# Patient Record
Sex: MALE | Race: WHITE
[De-identification: names, ages, dates, MRNs, and addresses within clinical notes are randomized per-mention and may not be internally consistent; named-entity substitution may affect disease eponyms.]

---

## 2018-06-19 ENCOUNTER — HOSPITAL ENCOUNTER (OUTPATIENT)
Age: 63
End: 2018-06-19
Payer: MEDICARE

## 2018-06-19 DIAGNOSIS — M79.604: Primary | ICD-10-CM

## 2018-06-19 DIAGNOSIS — M79.89: ICD-10-CM

## 2018-06-19 DIAGNOSIS — M25.561: ICD-10-CM

## 2018-06-19 PROCEDURE — 73562 X-RAY EXAM OF KNEE 3: CPT

## 2018-06-19 PROCEDURE — 93971 EXTREMITY STUDY: CPT

## 2018-12-07 ENCOUNTER — HOSPITAL ENCOUNTER (OUTPATIENT)
Age: 63
End: 2018-12-07
Payer: MEDICARE

## 2018-12-07 DIAGNOSIS — M17.0: ICD-10-CM

## 2018-12-07 DIAGNOSIS — R05: ICD-10-CM

## 2018-12-07 DIAGNOSIS — N62: Primary | ICD-10-CM

## 2018-12-07 PROCEDURE — 76641 ULTRASOUND BREAST COMPLETE: CPT

## 2018-12-07 PROCEDURE — 71046 X-RAY EXAM CHEST 2 VIEWS: CPT

## 2018-12-07 PROCEDURE — 77066 DX MAMMO INCL CAD BI: CPT

## 2018-12-07 PROCEDURE — 73565 X-RAY EXAM OF KNEES: CPT

## 2018-12-27 ENCOUNTER — HOSPITAL ENCOUNTER (OUTPATIENT)
Age: 63
End: 2018-12-27
Payer: MEDICARE

## 2018-12-27 DIAGNOSIS — M25.561: Primary | ICD-10-CM

## 2018-12-27 DIAGNOSIS — M25.562: ICD-10-CM

## 2018-12-27 PROCEDURE — 73564 X-RAY EXAM KNEE 4 OR MORE: CPT

## 2019-03-25 ENCOUNTER — HOSPITAL ENCOUNTER (OUTPATIENT)
Age: 64
End: 2019-03-25
Payer: MEDICARE

## 2019-03-25 DIAGNOSIS — R60.1: Primary | ICD-10-CM

## 2019-03-25 PROCEDURE — 93971 EXTREMITY STUDY: CPT

## 2019-05-15 ENCOUNTER — OFFICE VISIT (OUTPATIENT)
Dept: ORTHOPEDIC SURGERY | Facility: CLINIC | Age: 64
End: 2019-05-15

## 2019-05-15 VITALS — HEART RATE: 92 BPM | BODY MASS INDEX: 34.01 KG/M2 | OXYGEN SATURATION: 99 % | WEIGHT: 242.95 LBS | HEIGHT: 71 IN

## 2019-05-15 DIAGNOSIS — M17.0 PRIMARY OSTEOARTHRITIS OF BOTH KNEES: Primary | ICD-10-CM

## 2019-05-15 PROCEDURE — 99204 OFFICE O/P NEW MOD 45 MIN: CPT | Performed by: PHYSICIAN ASSISTANT

## 2019-05-15 RX ORDER — ATORVASTATIN CALCIUM 80 MG/1
80 TABLET, FILM COATED ORAL NIGHTLY
COMMUNITY
Start: 2019-03-07

## 2019-05-15 RX ORDER — CARVEDILOL 12.5 MG/1
TABLET ORAL
COMMUNITY
Start: 2019-04-22 | End: 2021-02-17

## 2019-05-15 RX ORDER — LEVOTHYROXINE SODIUM 0.07 MG/1
75 TABLET ORAL DAILY
COMMUNITY
Start: 2019-02-28

## 2019-05-15 RX ORDER — HYDROCODONE BITARTRATE AND ACETAMINOPHEN 7.5; 325 MG/1; MG/1
1 TABLET ORAL EVERY 6 HOURS PRN
Status: ON HOLD | COMMUNITY
Start: 2019-04-18 | End: 2021-04-13 | Stop reason: SDUPTHER

## 2019-05-15 RX ORDER — ALLOPURINOL 100 MG/1
100 TABLET ORAL DAILY
COMMUNITY
End: 2021-02-17

## 2019-05-15 RX ORDER — LOSARTAN POTASSIUM 100 MG/1
100 TABLET ORAL DAILY
COMMUNITY

## 2019-05-15 RX ORDER — MELOXICAM 15 MG/1
TABLET ORAL
COMMUNITY
Start: 2019-04-20 | End: 2019-05-17

## 2019-05-15 RX ORDER — HYDROCHLOROTHIAZIDE 25 MG/1
TABLET ORAL
COMMUNITY
Start: 2019-02-05 | End: 2021-02-17

## 2019-05-15 NOTE — PROGRESS NOTES
[facility] [place] [facility] Note    Subjective     Patient: Artur Alicia  : 1955    Primary Care Provider: Derrick Acosta MD    Requesting Provider: As above    Pain of the Left Knee and Pain of the Right Knee      History    Chief Complaint: Bilateral knee pain    History of Present Illness: This is a very pleasant 64-year-old male presenting today to discuss his bilateral knee pain.  He reports one is not more painful than the other.  He has had pain for 1 or more years.  He is status post right knee surgery in .  He reports he was in a long-leg cast for 6 months following surgery on a ligament.  He complains of pain to be 5/10 with both functional and night pain.  He complains of swelling pain and stiffness.  He is treated with anti-inflammatories and can use weight loss as well as steroid injection with with persisting day and night pain.  He has been treated by orthopedist at [facility] with steroid injection in the right knee on 2019 and on the left in May 2019.  He is not getting any relief from the steroids any longer.  He is used both topical and oral anti-inflammatories.  He is ready to discuss knee replacement.    Current Outpatient Medications on File Prior to Visit   Medication Sig Dispense Refill   • allopurinol (ZYLOPRIM) 100 MG tablet Take 100 mg by mouth Daily.     • losartan (COZAAR) 100 MG tablet Take 100 mg by mouth Daily.     • atorvastatin (LIPITOR) 80 MG tablet      • carvedilol (COREG) 12.5 MG tablet      • hydrochlorothiazide (HYDRODIURIL) 25 MG tablet      • HYDROcodone-acetaminophen (NORCO) 7.5-325 MG per tablet      • levothyroxine (SYNTHROID, LEVOTHROID) 75 MCG tablet      • meloxicam (MOBIC) 15 MG tablet        No current facility-administered medications on file prior to visit.       Allergies   Allergen Reactions   • Keflex [Cephalexin] Hives and Rash      Past Medical History:   Diagnosis Date   • Hypertension      Past Surgical History:  "  Procedure Laterality Date   • KNEE SURGERY Right      Family History   Problem Relation Age of Onset   • No Known Problems Mother    • Heart attack Father       Social History     Socioeconomic History   • Marital status:      Spouse name: Not on file   • Number of children: Not on file   • Years of education: Not on file   • Highest education level: Not on file   Tobacco Use   • Smoking status: Former Smoker     Packs/day: 2.00     Years: 30.00     Pack years: 60.00     Types: Cigarettes     Last attempt to quit: 2010     Years since quittin.3   • Smokeless tobacco: Never Used   Substance and Sexual Activity   • Alcohol use: No     Frequency: Never   • Drug use: No   • Sexual activity: Defer        Review of Systems   Constitutional: Negative.    HENT: Negative.    Eyes: Negative.    Respiratory: Negative.    Cardiovascular: Negative.    Gastrointestinal: Negative.    Endocrine: Negative.    Genitourinary: Negative.    Musculoskeletal: Positive for arthralgias.   Skin: Negative.    Allergic/Immunologic: Negative.    Neurological: Negative.    Hematological: Negative.    Psychiatric/Behavioral: Negative.        The following portions of the patient's history were reviewed and updated as appropriate: allergies, current medications, past family history, past medical history, past social history, past surgical history and problem list.      Objective      Physical Exam  Pulse 92   Ht 180.3 cm (71\")   Wt 110 kg (242 lb 15.2 oz)   SpO2 99%   BMI 33.88 kg/m²     Body mass index is 33.88 kg/m².    GENERAL: Body habitus: obese    Lower extremity edema: Left: trace; Right: trace    Varicose veins:  Left: mild; Right: mild    Gait: antalgic     Mental Status:  awake and alert; oriented to person, place, and time    Voice:  clear  SKIN:  Normal    Hair Growth:  Right:normal; Left:  normal  HEENT: Head: Normocephalic, atraumatic,  without obvious abnormality.  eye: normal external eye, no " icterus   PULM:  Repiratory effort normal    Ortho Exam  Right Hip Exam  ----------  FLEXION CONTRACTURE: None  FLEXION: 110 degrees  INTERNAL ROTATION: 20 degrees at 90 degrees of flexion   EXTERNAL ROTATION: 40 degrees at 90 degrees of flexion    PAIN WITH HIP MOTION: no      Right Knee Exam  ----------  ALIGNMENT: Right: neutral----------  RANGE OF MOTION:  Right: Normal (0-120 degrees) with no extensor lag or flexion contracture  LIGAMENTOUS STABILITY:   Right:stable to varus and valgus stress at terminal extension and 30 degrees without any evidence of laxity----------  STRENGTH:  KNEE FLEXION Right 5/5  KNEE EXTENSION Right 5/5 ----------  PAIN WITH PALPATION: Right medial joint line and lateral joint line  PAIN WITH KNEE ROM: Right no  PATELLAR CREPITUS: Right yes   ----------  SENSATION TO LIGHT TOUCH:  DEEP PERONEAL/SUPERFICIAL PERONEAL/SURAL/SAPHENOUS/TIBIAL:   Right intact----------  EFFUSION  Right:  no;  ERYTHEMA:  Right: no;  WOUNDS/INCISIONS: well healed lateral incision, no overlying skin problems.    Left Hip Exam  ---------  FLEXION CONTRACTURE: None  FLEXION: 110 degrees  INTERNAL ROTATION: 20 degrees at 90 degrees of flexion   EXTERNAL ROTATION: 40 degrees at 90 degrees of flexion    PAIN WITH HIP MOTION: no      Left Knee Exam  ----------  Knee Exam:  ----------  ALIGNMENT:  Left: neutral  ----------  RANGE OF MOTION:  Left: Normal (0-120 degrees) with no extensor lag or flexion contracture  LIGAMENTOUS STABILITY:   Left:stable to varus and valgus stress at terminal extension and 30 degrees without any evidence of laxity   ----------  STRENGTH:  KNEE FLEXION  Left 5/5  KNEE EXTENSION Left 5/5  ----------  PAIN WITH PALPATION: Left medial joint line and lateral joint line  PAIN WITH KNEE ROM:  Left no  PATELLAR CREPITUS:  Left yes  ----------  SENSATION TO LIGHT TOUCH:  DEEP PERONEAL/SUPERFICIAL PERONEAL/SURAL/SAPHENOUS/TIBIAL:   Left intact  ----------  EFFUSION:   Left:  no  ERYTHEMA:  ; Left:   no  WOUNDS/INCISIONS: none, no overlying skin problems.      Medical Decision Making    Data Review:   ordered and reviewed x-rays today    Assessment:  1. Primary osteoarthritis of both knees        Plan:  Bilateral knee arthritis.  I reviewed today's x-rays and clinical findings with the patient.  On exam, he has medial lateral joint line tenderness bilaterally with crepitus no evidence of ligament or meniscal pathology causing his pain.  X-rays today show bone-on-bone contact medial compartment, tricompartmental osteophytes, no acute bony abnormalities, with patellofemoral degeneration bilaterally.  Patient has exhausted conservative treatment with injections anti-inflammatories, weight loss, cane use, activity modification with persisting and worsening day and night pain.  He is ready to consider knee replacement and would like to have both knees done at once.  He did have a steroid injection in the left knee on May 3, therefore, he would not be able to have surgery until early August.  We discussed with him the perioperative and postoperative period of knee replacement.  Emphasized that having bilateral knee replacements essentially doubles the risks involved with surgery.  Patient understands it would like to proceed with bilateral total knee.  He does have a good help at home.  Plan today is to get him scheduled for a bilateral total knee arthroplasty with Dr. Kline at [facility] in August 2019.    Surgical Counseling     I have informed the patient of the diagnosis and the prognosis.  Exhaustive conservative treatment modalities have not resulted in long term pain relief.  The symptoms have progressed to the point of daily pain and inability to perform activities of daily living without significant pain. The patient has reached the point of desiring to proceed with total knee arthroplasty after discussing the risks, benefits and alternatives to the procedure.  The surgical procedure itself was discussed  in detail. Risks of the procedure were discussed, which included but are not limited to, bleeding, infection, damage to blood vessels and nerves, incomplete pain relief, loosening of the prosthesis, deep infection, need for further surgery, loss of limb, deep venous thrombosis, pulmonary embolus, death, heart attack, stroke, kidney failure, liver failure, and anesthetic complications.  In addition, the potential for deep infection developing in the future was discussed, which could require further surgery.  The knee would have to be re-opened, debrided, and potentially remove the prosthesis, which may or may not be replaced in the future.  Also, the possibility for loosening of the prosthesis has been mentioned.  If the prosthesis loosened, a revision arthroplasty could be performed, with results that are not as predictable compared to the original procedure.  The typical rehabilitative course has also been discussed, and full recovery may take up to a year to see the maximum benefit.  The importance of patient cooperation in the rehabilitative efforts has also been discussed.  No guarantees whatsoever were given.  The patient understands the potential risks versus the benefits and desires to proceed with total knee arthroplasty at a mutually convenient time.        Nona Lopes PA-C  05/16/19  4:00 PM

## 2019-05-17 ENCOUNTER — PREP FOR SURGERY (OUTPATIENT)
Dept: OTHER | Facility: HOSPITAL | Age: 64
End: 2019-05-17

## 2019-05-17 DIAGNOSIS — M17.0 PRIMARY OSTEOARTHRITIS OF BOTH KNEES: Primary | ICD-10-CM

## 2019-05-17 RX ORDER — PREGABALIN 150 MG/1
150 CAPSULE ORAL ONCE
Status: CANCELLED | OUTPATIENT
Start: 2019-05-17 | End: 2019-05-17

## 2019-05-17 RX ORDER — ACETAMINOPHEN 500 MG
1000 TABLET ORAL ONCE
Status: CANCELLED | OUTPATIENT
Start: 2019-05-17 | End: 2019-05-17

## 2019-05-17 RX ORDER — CEFAZOLIN SODIUM 2 G/100ML
2 INJECTION, SOLUTION INTRAVENOUS ONCE
Status: CANCELLED | OUTPATIENT
Start: 2019-05-17 | End: 2019-05-17

## 2019-05-17 RX ORDER — MELOXICAM 15 MG/1
15 TABLET ORAL ONCE
Status: CANCELLED | OUTPATIENT
Start: 2019-05-17 | End: 2019-05-17

## 2019-05-17 NOTE — PROGRESS NOTES
I have reviewed the notes, assessments, and/or procedures performed by Nona Lopes PA-C, I concur with her documentation of Artur Alicia.

## 2019-06-03 ENCOUNTER — TELEPHONE (OUTPATIENT)
Dept: ORTHOPEDIC SURGERY | Facility: CLINIC | Age: 64
End: 2019-06-03

## 2019-06-03 NOTE — TELEPHONE ENCOUNTER
I called him back and advised him to elevate his knees higher than his heart, ice them 20 minutes out of the hour and to also use a compression sleeve or ace wrap around the knee as well as to take some Aleve. He understood and will try these things.  Brittaney

## 2019-07-10 ENCOUNTER — HOSPITAL ENCOUNTER (OUTPATIENT)
Age: 64
End: 2019-07-10
Payer: MEDICARE

## 2019-07-10 DIAGNOSIS — M19.90: ICD-10-CM

## 2019-07-10 DIAGNOSIS — M1A.09X0: Primary | ICD-10-CM

## 2019-07-10 LAB
ALBUMIN LEVEL: 3 GM/DL (ref 3.4–5)
ALBUMIN/GLOB SERPL: 0.8 {RATIO} (ref 1.1–1.8)
ALP ISO SERPL-ACNC: 98 U/L (ref 46–116)
ALT SERPLBLD-CCNC: 30 U/L (ref 12–78)
ANION GAP SERPL CALC-SCNC: 14.1 MEQ/L (ref 5–15)
AST SERPL QL: 24 U/L (ref 15–37)
BILIRUBIN,TOTAL: 0.6 MG/DL (ref 0.2–1)
BUN SERPL-MCNC: 18 MG/DL (ref 7–18)
CALCIUM SPEC-MCNC: 9.4 MG/DL (ref 8.5–10.1)
CHLORIDE SPEC-SCNC: 100 MMOL/L (ref 98–107)
CO2 SERPL-SCNC: 26 MMOL/L (ref 21–32)
CREAT BLD-SCNC: 1.1 MG/DL (ref 0.7–1.3)
ESTIMATED GLOMERULAR FILT RATE: 68 ML/MIN (ref 60–?)
GFR (AFRICAN AMERICAN): 82 ML/MIN (ref 60–?)
GLOBULIN SER CALC-MCNC: 3.9 GM/DL (ref 1.3–3.2)
GLUCOSE: 107 MG/DL (ref 74–106)
HCT VFR BLD CALC: 33.1 % (ref 42–52)
HGB BLD-MCNC: 10.4 G/DL (ref 14.1–18)
MCHC RBC-ENTMCNC: 31.3 G/DL (ref 31.8–35.4)
MCV RBC: 91.4 FL (ref 80–94)
MEAN CORPUSCULAR HEMOGLOBIN: 28.6 PG (ref 27–31.2)
PLATELET # BLD: 395 K/MM3 (ref 142–424)
POTASSIUM: 4.1 MMOL/L (ref 3.5–5.1)
PROT SERPL-MCNC: 6.9 GM/DL (ref 6.4–8.2)
RBC # BLD AUTO: 3.62 M/MM3 (ref 4.6–6.2)
SODIUM SPEC-SCNC: 136 MMOL/L (ref 136–145)
URATE SERPL-SCNC: 5.7 MG/DL (ref 2.6–7.2)
WBC # BLD AUTO: 5.5 K/MM3 (ref 4.8–10.8)

## 2019-07-10 PROCEDURE — 80053 COMPREHEN METABOLIC PANEL: CPT

## 2019-07-10 PROCEDURE — 86235 NUCLEAR ANTIGEN ANTIBODY: CPT

## 2019-07-10 PROCEDURE — 86431 RHEUMATOID FACTOR QUANT: CPT

## 2019-07-10 PROCEDURE — 86225 DNA ANTIBODY NATIVE: CPT

## 2019-07-10 PROCEDURE — 85025 COMPLETE CBC W/AUTO DIFF WBC: CPT

## 2019-07-10 PROCEDURE — 36415 COLL VENOUS BLD VENIPUNCTURE: CPT

## 2019-07-10 PROCEDURE — 84550 ASSAY OF BLOOD/URIC ACID: CPT

## 2019-07-10 PROCEDURE — 86200 CCP ANTIBODY: CPT

## 2019-07-10 PROCEDURE — 86038 ANTINUCLEAR ANTIBODIES: CPT

## 2019-07-11 LAB — RA LATEX TURBID.: 219 IU/ML (ref 0–13.9)

## 2019-07-12 LAB — CCP IGA+IGG SERPL IA-ACNC: 232 UNITS (ref 0–19)

## 2019-07-25 ENCOUNTER — APPOINTMENT (OUTPATIENT)
Dept: PREADMISSION TESTING | Facility: HOSPITAL | Age: 64
End: 2019-07-25

## 2019-09-03 ENCOUNTER — TELEPHONE (OUTPATIENT)
Dept: ORTHOPEDIC SURGERY | Facility: CLINIC | Age: 64
End: 2019-09-03

## 2019-09-03 NOTE — TELEPHONE ENCOUNTER
----- Message from Edenilson Kline MD sent at 8/30/2019  2:53 PM EDT -----  Regarding: RE: Surgery  Thanks - he may be back in the future looking at his xrays!    ----- Message -----  From: Geri Martin  Sent: 8/30/2019  12:34 PM  To: Edenilson Kline MD  Subject: RE: Surgery                                      I did call patient, he told me he cancelled at the advice of his PCP Dr Acosta, due to a new onset of rheumatoid arthritis.  There is a note in patient's chart from Dr Acosta.  ----- Message -----  From: Edenilson Kline MD  Sent: 8/30/2019  11:32 AM  To: Geri Martin  Subject: Surgery                                          Remind me - why did he cancel surgery?

## 2021-02-17 ENCOUNTER — OFFICE VISIT (OUTPATIENT)
Dept: ORTHOPEDIC SURGERY | Facility: CLINIC | Age: 66
End: 2021-02-17

## 2021-02-17 VITALS — HEIGHT: 71 IN | HEART RATE: 75 BPM | BODY MASS INDEX: 37.18 KG/M2 | OXYGEN SATURATION: 99 % | WEIGHT: 265.6 LBS

## 2021-02-17 DIAGNOSIS — M17.12 PRIMARY OSTEOARTHRITIS OF LEFT KNEE: Primary | ICD-10-CM

## 2021-02-17 DIAGNOSIS — M17.11 PRIMARY OSTEOARTHRITIS OF RIGHT KNEE: ICD-10-CM

## 2021-02-17 PROCEDURE — 99214 OFFICE O/P EST MOD 30 MIN: CPT | Performed by: ORTHOPAEDIC SURGERY

## 2021-02-17 RX ORDER — ACETAMINOPHEN 325 MG/1
1000 TABLET ORAL ONCE
Status: CANCELLED | OUTPATIENT
Start: 2021-02-17 | End: 2021-02-17

## 2021-02-17 RX ORDER — PREGABALIN 150 MG/1
150 CAPSULE ORAL ONCE
Status: CANCELLED | OUTPATIENT
Start: 2021-02-17 | End: 2021-02-17

## 2021-02-17 RX ORDER — MELOXICAM 7.5 MG/1
15 TABLET ORAL ONCE
Status: CANCELLED | OUTPATIENT
Start: 2021-02-17 | End: 2021-02-17

## 2021-02-17 RX ORDER — BISOPROLOL FUMARATE 10 MG/1
10 TABLET, FILM COATED ORAL DAILY
COMMUNITY
Start: 2020-11-30

## 2021-02-17 RX ORDER — GABAPENTIN 100 MG/1
100 CAPSULE ORAL 3 TIMES DAILY PRN
COMMUNITY
Start: 2021-02-05

## 2021-02-17 NOTE — PROGRESS NOTES
[facility] [place] [facility] [place]    Subjective     Chief Complaint   Patient presents with   • Left Knee - Pain     Left knee pain worse than right   • Right Knee - Pain        HPI    Artur Pearson is a 66 y.o. male who presents with new problem of bilateral knee pain, left worse than right. He has a known history of arthritis in bilateral knees. The patient would like to have surgery performed on his left knee at this time. He reports his pain is an 8 out of 10. His pain worsens when he walks, climbs stairs, and stands. His pain is associated with stiffness and giving away. He has tried 2 previous knee injections. The 1st injection provided him great relief and states that the 2nd injection provided minimal relief due to getting rheumatoid arthritis. His last injection was over 1 year ago.     Artur has no medical history of diabetes. Additionally, he does not smoke. He denies any history of clots or clotting problems. He is not currently taking anticoagulants.     I have reviewed the following portions of the patient's history and agree with: History of Present Illness and Review of Systems    Patient Active Problem List   Diagnosis   • Primary osteoarthritis of both knees     Past Medical History:   Diagnosis Date   • Hypertension    • Rheumatoid arthritis (CMS/HCC)       Past Surgical History:   Procedure Laterality Date   • KNEE SURGERY Right       Family History   Problem Relation Age of Onset   • No Known Problems Mother    • Heart attack Father      Social History     Socioeconomic History   • Marital status:      Spouse name: Not on file   • Number of children: Not on file   • Years of education: Not on file   • Highest education level: Not on file   Tobacco Use   • Smoking status: Former Smoker     Packs/day: 2.00     Years: 30.00     Pack years: 60.00     Types: Cigarettes     Quit date:      Years since quittin.1   • Smokeless tobacco: Never Used   Substance and Sexual  Activity   • Alcohol use: No     Frequency: Never   • Drug use: No   • Sexual activity: Defer      Current Outpatient Medications on File Prior to Visit   Medication Sig Dispense Refill   • atorvastatin (LIPITOR) 80 MG tablet      • bisoprolol (ZEBeta) 10 MG tablet      • Etanercept (ENBREL SC) Inject  under the skin into the appropriate area as directed 1 (One) Time Per Week.     • gabapentin (NEURONTIN) 100 MG capsule      • HYDROcodone-acetaminophen (NORCO) 7.5-325 MG per tablet      • levothyroxine (SYNTHROID, LEVOTHROID) 75 MCG tablet      • losartan (COZAAR) 100 MG tablet Take 100 mg by mouth Daily.       No current facility-administered medications on file prior to visit.       Allergies   Allergen Reactions   • Keflex [Cephalexin] Hives and Rash        Review of Systems   Constitutional: Negative for activity change, appetite change, chills, diaphoresis, fatigue, fever and unexpected weight change.   HENT: Negative for congestion, dental problem, drooling, ear discharge, ear pain, facial swelling, hearing loss, mouth sores, nosebleeds, postnasal drip, rhinorrhea, sinus pressure, sneezing, sore throat, tinnitus, trouble swallowing and voice change.    Eyes: Negative for photophobia, pain, discharge, redness, itching and visual disturbance.   Respiratory: Negative for apnea, cough, choking, chest tightness, shortness of breath, wheezing and stridor.    Cardiovascular: Negative for chest pain, palpitations and leg swelling.   Gastrointestinal: Negative for abdominal distention, abdominal pain, anal bleeding, blood in stool, constipation, diarrhea, nausea, rectal pain and vomiting.   Endocrine: Negative for cold intolerance, heat intolerance, polydipsia, polyphagia and polyuria.   Genitourinary: Negative for decreased urine volume, difficulty urinating, dysuria, enuresis, flank pain, frequency, genital sores, hematuria and urgency.   Musculoskeletal: Positive for arthralgias. Negative for back pain, gait  "problem, joint swelling, myalgias, neck pain and neck stiffness.   Skin: Negative for color change, pallor, rash and wound.   Allergic/Immunologic: Negative for environmental allergies, food allergies and immunocompromised state.   Neurological: Negative for dizziness, tremors, seizures, syncope, facial asymmetry, speech difficulty, weakness, light-headedness, numbness and headaches.   Hematological: Negative for adenopathy. Does not bruise/bleed easily.   Psychiatric/Behavioral: Negative for agitation, behavioral problems, confusion, decreased concentration, dysphoric mood, hallucinations, self-injury, sleep disturbance and suicidal ideas. The patient is not nervous/anxious and is not hyperactive.         Objective      Physical Exam  Pulse 75   Ht 180.3 cm (70.98\")   Wt 120 kg (265 lb 9.6 oz)   SpO2 99%   BMI 37.06 kg/m²     Body mass index is 37.06 kg/m².      General:   • Mental Status:  Alert   • Appearance: Cooperative, in no acute distress   • Build and Nutrition: Obese male.  • Orientation: Alert and oriented to person, place and time   • Posture: Normal   • Gait: Antalgic on both lower extremities.     Integument  • Right knee: No skin lesions, rash, or ecchymosis.  • Left knee: No skin lesions, rash, or ecchymosis.    Lower Extremities  • Right Knee:       • Tenderness: No medial or lateral joint line tenderness.       • Swelling: None       • Effusion: None      • Crepitus: Positive.      • Atrophy: None      • Range of motion:        • Extension: 5°        • Flexion: 120°       • Instability: No varus or valgus laxity. Negative anterior drawer.      • Deformities: Varus alignment.    Lower Extremities  • Left knee:       • Tenderness: Medial joint line tenderness.       • Swelling: None       • Effusion: None      • Crepitus: Positive      • Atrophy: None      • Range of motion:        • Extension: 5°        • Flexion: 120°       • Instability: No varus or valgus laxity. Negative anterior drawer.     "  • Deformities: Varus alignment.    Imaging/Studies      Imaging Results (Last 24 Hours)     Procedure Component Value Units Date/Time    XR Knee 4+ View Bilateral [[id number]] Resulted: 02/17/21 1648     Updated: 02/17/21 1649    Narrative:      Right Knee Radiographs  Indication: right knee pain  Views: Standing AP's and skiers of both knees, with lateral and sunrise   views of the right knee    Comparison: 5/15/2019    Findings:   Bone-on-bone contact both medially and laterally, with tricompartmental   osteophytes, no acute bony abnormalities.  Worsening compared to previous   films.  No unusual bony features.    Left Knee Radiographs  Indication: left knee pain  Views: Standing AP's and skiers of both knees, with lateral and sunrise   views of the left knee    Comparison: 5/15/2019    Findings:   Bone-on-bone contact both medially and laterally, with tricompartmental   osteophytes, no acute bony abnormalities.  Worsening compared to previous   films.  No unusual bony features.          Assessment and Plan     Diagnoses and all orders for this visit:    1. Primary osteoarthritis of left knee (Primary)  -     XR Knee 4+ View Bilateral  -     Case Request; Standing  -     Instructions on coughing, deep breathing, and incentive spirometry.; Future  -     CBC and Differential; Future  -     Basic metabolic panel; Future  -     Protime-INR; Future  -     APTT; Future  -     Hemoglobin A1c; Future  -     Sedimentation rate; Future  -     C-reactive protein; Future  -     Tranexamic Acid 1,000 mg in sodium chloride 0.9 % 100 mL  -     Tranexamic Acid 1,000 mg in sodium chloride 0.9 % 100 mL  -     acetaminophen (TYLENOL) tablet 975 mg  -     meloxicam (MOBIC) tablet 15 mg  -     pregabalin (LYRICA) capsule 150 mg  -     mupirocin (BACTROBAN) 2 % nasal ointment 1 application  -     clindamycin (CLEOCIN) 900 mg in dextrose (D5W) 5 % 100 mL IVPB  -     vancomycin (VANCOCIN) 1,750 mg in sodium chloride 0.9 % 250 mL IVPB  -      Case Request    2. Primary osteoarthritis of right knee  -     XR Knee 4+ View Bilateral    Other orders  -     Outpatient In A Bed; Standing  -     Follow Anesthesia Guidelines / Standing Orders; Future  -     Obtain informed consent  -     Provide instructions to patient regarding NPO status  -     Chlorhexidine Skin Prep - Educate and Review With Patient; Future  -     Provide Patient With ERAS Hydration Instructions  -     Provide Patient With Enhanced Recovery Booklet(s) or Handout  -     Provide Instructions/Handout For Benzoyl Peroxide 5% Wash If Having Shoulder/Arm Surgery (If Prescribed)  -     Provide Instructions/Handout For Bactroban And Chlorhexidine Shower (If Prescribed)  -     Perform A Memory Screening On All Hip/Knee Replacement Patients >Or Equal To 65 Years Or Older  -     Complete A PROMIS And HOOS Or KOOS Survey If Having Hip Or Knee Replacement  -     Follow Anesthesia Guidelines / Standing Orders; Standing  -     Nerve Block; Standing  -     Verify NPO Status; Standing  -     Verify The Time Patient Completed ERAS Hydration Drink; Standing  -     SCD (sequential compression device)- to be placed on patient in Pre-op; Standing  -     Clip operative site; Standing  -     Obtain informed consent (if not collected inpatient or PAT); Standing  -     Notify Physician - Standard; Standing  -     Provide Patient With Carbo Loading Instructions  -     Provide Patient With ERAS Booklet(s)/Handout  -     mupirocin (BACTROBAN) 2 % ointment; place into the nostril(s) as directed by provider 2 (Two) Times a Day.  Dispense: 22 g; Refill: 0  -     Chlorhexidine Gluconate 4 % solution; Apply  topically to the appropriate area as directed Daily.  Dispense: 237 mL; Refill: 0        1. Primary osteoarthritis of left knee    2. Primary osteoarthritis of right knee        The patient has reached a point where he would like to proceed with a knee arthroplasty. We discussed that he is a good candidate for  surgery. I have advised him on keeping his weight down as it will help his replacement last longer. We advised the patient to stop his weekly Enbrel injections 2 weeks prior to surgery, and we can restart it 2 weeks after surgery. He can continue taking gabapentin without interruption.     Surgical Counseling   I have informed the patient of the diagnosis and the prognosis.  Exhaustive conservative treatment modalities have not resulted in long term pain relief.  The symptoms have progressed to the point of daily pain and inability to perform activities of daily living without significant pain. The patient has reached the point of desiring to proceed with total knee arthroplasty after discussing the risks, benefits and alternatives to the procedure.  The surgical procedure itself was discussed in detail. Risks of the procedure were discussed, which included but are not limited to, bleeding, infection, damage to blood vessels and nerves, incomplete pain relief, loosening of the prosthesis (early or late), deep infection (early or late), need for further surgery, loss of limb, deep venous thrombosis, pulmonary embolus, death, heart attack, stroke, kidney failure, liver failure, and anesthetic complications.  In addition, the potential for deep infection developing in the future was discussed, which could require further surgery.  The knee would have to be re-opened, debrided, and potentially remove the prosthesis, which may or may not be replaced in the future.  Also, the possibility for loosening of the prosthesis has been mentioned.  If the prosthesis loosened, a revision arthroplasty could be performed, with results that are not as predictable compared to the original procedure.  The typical rehabilitative course has also been discussed, and full recovery may take up to a year to see the maximum benefit.  The importance of patient cooperation in the rehabilitative efforts has also been discussed.  No guarantees  were given.  The patient understands the potential risks versus the benefits and desires to proceed with total knee arthroplasty at a mutually convenient time.    Return for surgery.    Medical Decision Making  Management Options : Moderate: 2 chronic illnesses with decision regarding elective major surgery    Scribed for Edenilson Kline MD by ANGELIQUE HEAD.  2/18/2021  19:48 EST    I Edenilson Kline MD have personally performed the services described in this document as scribed by the above individual, and it is both accurate and complete.     Edenilson Kline MD  2/18/2021  12:01 EST

## 2021-02-19 PROBLEM — M17.12 PRIMARY OSTEOARTHRITIS OF LEFT KNEE: Status: ACTIVE | Noted: 2021-02-19

## 2021-04-01 ENCOUNTER — APPOINTMENT (OUTPATIENT)
Dept: PREADMISSION TESTING | Facility: HOSPITAL | Age: 66
End: 2021-04-01

## 2021-04-01 VITALS — HEIGHT: 70 IN | WEIGHT: 267 LBS | BODY MASS INDEX: 38.22 KG/M2

## 2021-04-01 DIAGNOSIS — M17.12 PRIMARY OSTEOARTHRITIS OF LEFT KNEE: ICD-10-CM

## 2021-04-01 LAB
ANION GAP SERPL CALCULATED.3IONS-SCNC: 12 MMOL/L (ref 5–15)
APTT PPP: 32.4 SECONDS (ref 24–37)
BASOPHILS # BLD AUTO: 0.04 10*3/MM3 (ref 0–0.2)
BASOPHILS NFR BLD AUTO: 0.8 % (ref 0–1.5)
BUN SERPL-MCNC: 17 MG/DL (ref 8–23)
BUN/CREAT SERPL: 13.7 (ref 7–25)
CALCIUM SPEC-SCNC: 9.5 MG/DL (ref 8.6–10.5)
CHLORIDE SERPL-SCNC: 100 MMOL/L (ref 98–107)
CO2 SERPL-SCNC: 25 MMOL/L (ref 22–29)
CREAT SERPL-MCNC: 1.24 MG/DL (ref 0.76–1.27)
CRP SERPL-MCNC: 0.54 MG/DL (ref 0–0.5)
DEPRECATED RDW RBC AUTO: 43.4 FL (ref 37–54)
EOSINOPHIL # BLD AUTO: 0.12 10*3/MM3 (ref 0–0.4)
EOSINOPHIL NFR BLD AUTO: 2.5 % (ref 0.3–6.2)
ERYTHROCYTE [DISTWIDTH] IN BLOOD BY AUTOMATED COUNT: 12.9 % (ref 12.3–15.4)
ERYTHROCYTE [SEDIMENTATION RATE] IN BLOOD: 26 MM/HR (ref 0–20)
GFR SERPL CREATININE-BSD FRML MDRD: 59 ML/MIN/1.73
GLUCOSE SERPL-MCNC: 103 MG/DL (ref 65–99)
HBA1C MFR BLD: 5.3 % (ref 4.8–5.6)
HCT VFR BLD AUTO: 41.7 % (ref 37.5–51)
HGB BLD-MCNC: 14 G/DL (ref 13–17.7)
IMM GRANULOCYTES # BLD AUTO: 0.01 10*3/MM3 (ref 0–0.05)
IMM GRANULOCYTES NFR BLD AUTO: 0.2 % (ref 0–0.5)
INR PPP: 1.07 (ref 0.85–1.16)
LYMPHOCYTES # BLD AUTO: 1.2 10*3/MM3 (ref 0.7–3.1)
LYMPHOCYTES NFR BLD AUTO: 24.7 % (ref 19.6–45.3)
MCH RBC QN AUTO: 30.8 PG (ref 26.6–33)
MCHC RBC AUTO-ENTMCNC: 33.6 G/DL (ref 31.5–35.7)
MCV RBC AUTO: 91.6 FL (ref 79–97)
MONOCYTES # BLD AUTO: 0.64 10*3/MM3 (ref 0.1–0.9)
MONOCYTES NFR BLD AUTO: 13.2 % (ref 5–12)
NEUTROPHILS NFR BLD AUTO: 2.85 10*3/MM3 (ref 1.7–7)
NEUTROPHILS NFR BLD AUTO: 58.6 % (ref 42.7–76)
NRBC BLD AUTO-RTO: 0 /100 WBC (ref 0–0.2)
PLATELET # BLD AUTO: 158 10*3/MM3 (ref 140–450)
PMV BLD AUTO: 10.1 FL (ref 6–12)
POTASSIUM SERPL-SCNC: 4.1 MMOL/L (ref 3.5–5.2)
PROTHROMBIN TIME: 13.6 SECONDS (ref 11.5–14)
QT INTERVAL: 430 MS
QTC INTERVAL: 418 MS
RBC # BLD AUTO: 4.55 10*6/MM3 (ref 4.14–5.8)
SODIUM SERPL-SCNC: 137 MMOL/L (ref 136–145)
WBC # BLD AUTO: 4.86 10*3/MM3 (ref 3.4–10.8)

## 2021-04-01 PROCEDURE — 83036 HEMOGLOBIN GLYCOSYLATED A1C: CPT

## 2021-04-01 PROCEDURE — 85730 THROMBOPLASTIN TIME PARTIAL: CPT

## 2021-04-01 PROCEDURE — 93010 ELECTROCARDIOGRAM REPORT: CPT | Performed by: INTERNAL MEDICINE

## 2021-04-01 PROCEDURE — 93005 ELECTROCARDIOGRAM TRACING: CPT

## 2021-04-01 PROCEDURE — 85025 COMPLETE CBC W/AUTO DIFF WBC: CPT

## 2021-04-01 PROCEDURE — 36415 COLL VENOUS BLD VENIPUNCTURE: CPT

## 2021-04-01 PROCEDURE — 86140 C-REACTIVE PROTEIN: CPT

## 2021-04-01 PROCEDURE — 80048 BASIC METABOLIC PNL TOTAL CA: CPT

## 2021-04-01 PROCEDURE — 85610 PROTHROMBIN TIME: CPT

## 2021-04-01 PROCEDURE — 85652 RBC SED RATE AUTOMATED: CPT

## 2021-04-01 ASSESSMENT — KOOS JR
KOOS JR SCORE: 50.012
KOOS JR SCORE: 15

## 2021-04-01 NOTE — PAT
Verified with patient that they received a prescription for Bactroban and Chlorhexidine shower from the office.  Reinforced with them to fill the prescription if they haven't already.  Verbal and written instructions given regarding proper use of the Bactroban and Chlorhexidine to patient and/or famlily during PAT visit. Patient/family also instructed to complete checklist and return it to Pre-op on the day of surgery.  Patient and/or family verbalized understanding.    Patient to apply Chlorhexadine wipes  to surgical area (as instructed) the night before procedure and the AM of procedure. Wipes provided.    Patient instructed to drink 20 ounces (or until full) of Gatorade and it needs to be completed 1 hour before given arrival time for procedure (NO RED Gatorade)    Patient verbalized understanding.    Per Anesthesia Request, patient instructed not to take their ACE/ARB medications on the AM of surgery.    Discussed with patient options for receiving total joint replacement education and assessed patient's ability and preference. Joint Replacement Guide given to patient during PAT visit since not received a copy within the last year. Encouraged patient/family to read guide thoroughly and notify PAT staff with any questions or concerns. Handout provided directing patient to links to watch online videos related to joint replacement surgery on the [facility] website. The handout gives detailed instructions for joining an online joint replacement class through Zoom or phone conference offered on Thursdays. Patient agreed to participate by either joining online class or by watching videos online, or both. Patient verbalized understanding of instructions and to complete the online learning tool survey. Encouraged to share information with family and/or . An overview of the joint replacement education was provided during the visit including general perioperative instructions that are routine for all surgical  patients (PAT PASS, wipes, directions to pre-op, etc.).

## 2021-04-11 ENCOUNTER — APPOINTMENT (OUTPATIENT)
Dept: PREADMISSION TESTING | Facility: HOSPITAL | Age: 66
End: 2021-04-11

## 2021-04-11 PROCEDURE — C9803 HOPD COVID-19 SPEC COLLECT: HCPCS

## 2021-04-11 PROCEDURE — U0004 COV-19 TEST NON-CDC HGH THRU: HCPCS

## 2021-04-12 LAB — SARS-COV-2 RNA NOSE QL NAA+PROBE: NOT DETECTED

## 2021-04-13 ENCOUNTER — ANESTHESIA EVENT CONVERTED (OUTPATIENT)
Dept: ANESTHESIOLOGY | Facility: HOSPITAL | Age: 66
End: 2021-04-13

## 2021-04-13 ENCOUNTER — ANESTHESIA EVENT (OUTPATIENT)
Dept: PERIOP | Facility: HOSPITAL | Age: 66
End: 2021-04-13

## 2021-04-13 ENCOUNTER — HOSPITAL ENCOUNTER (OUTPATIENT)
Facility: HOSPITAL | Age: 66
Discharge: HOME | End: 2021-04-13
Attending: ORTHOPAEDIC SURGERY | Admitting: ORTHOPAEDIC SURGERY

## 2021-04-13 ENCOUNTER — APPOINTMENT (OUTPATIENT)
Dept: GENERAL RADIOLOGY | Facility: HOSPITAL | Age: 66
End: 2021-04-13

## 2021-04-13 ENCOUNTER — ANESTHESIA (OUTPATIENT)
Dept: PERIOP | Facility: HOSPITAL | Age: 66
End: 2021-04-13

## 2021-04-13 VITALS
RESPIRATION RATE: 16 BRPM | WEIGHT: 267 LBS | SYSTOLIC BLOOD PRESSURE: 130 MMHG | HEART RATE: 61 BPM | DIASTOLIC BLOOD PRESSURE: 60 MMHG | OXYGEN SATURATION: 96 % | HEIGHT: 70 IN | TEMPERATURE: 98.4 F | BODY MASS INDEX: 38.22 KG/M2

## 2021-04-13 DIAGNOSIS — E66.9 OBESITY (BMI 30-39.9): ICD-10-CM

## 2021-04-13 DIAGNOSIS — Z96.652 S/P TOTAL KNEE ARTHROPLASTY, LEFT: Primary | ICD-10-CM

## 2021-04-13 DIAGNOSIS — M17.12 PRIMARY OSTEOARTHRITIS OF LEFT KNEE: ICD-10-CM

## 2021-04-13 DIAGNOSIS — Z74.09 IMPAIRED FUNCTIONAL MOBILITY, BALANCE, GAIT, AND ENDURANCE: ICD-10-CM

## 2021-04-13 PROBLEM — E78.5 HYPERLIPIDEMIA: Status: ACTIVE | Noted: 2021-04-13

## 2021-04-13 PROBLEM — I10 HTN (HYPERTENSION): Status: ACTIVE | Noted: 2021-04-13

## 2021-04-13 PROCEDURE — C1713 ANCHOR/SCREW BN/BN,TIS/BN: HCPCS | Performed by: ORTHOPAEDIC SURGERY

## 2021-04-13 PROCEDURE — 63710000001 MUPIROCIN 2 % OINTMENT 1 G TUBE: Performed by: ORTHOPAEDIC SURGERY

## 2021-04-13 PROCEDURE — A9270 NON-COVERED ITEM OR SERVICE: HCPCS | Performed by: ORTHOPAEDIC SURGERY

## 2021-04-13 PROCEDURE — 27447 TOTAL KNEE ARTHROPLASTY: CPT | Performed by: ORTHOPAEDIC SURGERY

## 2021-04-13 PROCEDURE — C1776 JOINT DEVICE (IMPLANTABLE): HCPCS | Performed by: ORTHOPAEDIC SURGERY

## 2021-04-13 PROCEDURE — 25010000002 ONDANSETRON PER 1 MG: Performed by: NURSE ANESTHETIST, CERTIFIED REGISTERED

## 2021-04-13 PROCEDURE — 63710000001 OXYCODONE 5 MG TABLET: Performed by: ORTHOPAEDIC SURGERY

## 2021-04-13 PROCEDURE — S0260 H&P FOR SURGERY: HCPCS | Performed by: ORTHOPAEDIC SURGERY

## 2021-04-13 PROCEDURE — 27447 TOTAL KNEE ARTHROPLASTY: CPT | Performed by: PHYSICIAN ASSISTANT

## 2021-04-13 PROCEDURE — 63710000001 FAMOTIDINE 20 MG TABLET: Performed by: ANESTHESIOLOGY

## 2021-04-13 PROCEDURE — 25010000002 ROPIVACAINE PER 1 MG: Performed by: ORTHOPAEDIC SURGERY

## 2021-04-13 PROCEDURE — C1889 IMPLANT/INSERT DEVICE, NOC: HCPCS | Performed by: ORTHOPAEDIC SURGERY

## 2021-04-13 PROCEDURE — 63710000001 MELOXICAM 15 MG TABLET: Performed by: ORTHOPAEDIC SURGERY

## 2021-04-13 PROCEDURE — 97161 PT EVAL LOW COMPLEX 20 MIN: CPT

## 2021-04-13 PROCEDURE — A9270 NON-COVERED ITEM OR SERVICE: HCPCS | Performed by: ANESTHESIOLOGY

## 2021-04-13 PROCEDURE — 25010000002 PROPOFOL 10 MG/ML EMULSION: Performed by: NURSE ANESTHETIST, CERTIFIED REGISTERED

## 2021-04-13 PROCEDURE — 25010000002 ROPIVACAINE PER 1 MG: Performed by: NURSE ANESTHETIST, CERTIFIED REGISTERED

## 2021-04-13 PROCEDURE — 63710000001 ACETAMINOPHEN 500 MG TABLET: Performed by: ORTHOPAEDIC SURGERY

## 2021-04-13 PROCEDURE — 25010000002 VANCOMYCIN 10 G RECONSTITUTED SOLUTION: Performed by: ORTHOPAEDIC SURGERY

## 2021-04-13 PROCEDURE — 63710000001 PREGABALIN 150 MG CAPSULE: Performed by: ORTHOPAEDIC SURGERY

## 2021-04-13 PROCEDURE — C1755 CATHETER, INTRASPINAL: HCPCS | Performed by: ORTHOPAEDIC SURGERY

## 2021-04-13 PROCEDURE — 73560 X-RAY EXAM OF KNEE 1 OR 2: CPT

## 2021-04-13 PROCEDURE — 97116 GAIT TRAINING THERAPY: CPT

## 2021-04-13 PROCEDURE — 63710000001 MELOXICAM 7.5 MG TABLET: Performed by: ORTHOPAEDIC SURGERY

## 2021-04-13 PROCEDURE — 25010000002 DEXAMETHASONE PER 1 MG: Performed by: NURSE ANESTHETIST, CERTIFIED REGISTERED

## 2021-04-13 DEVICE — CMT BONE SIMPLEX/P FULL DOSE 10/PK: Type: IMPLANTABLE DEVICE | Site: KNEE | Status: FUNCTIONAL

## 2021-04-13 DEVICE — DEV CONTRL TISS STRATAFIX SYMM PDS PLUS VIL CT-1 45CM: Type: IMPLANTABLE DEVICE | Site: KNEE | Status: FUNCTIONAL

## 2021-04-13 DEVICE — DEV CONTRL TISS STRATAFIX SPIRAL MNCRYL UD 3/0 PLS 60CM: Type: IMPLANTABLE DEVICE | Site: KNEE | Status: FUNCTIONAL

## 2021-04-13 DEVICE — IMPLANTABLE DEVICE: Type: IMPLANTABLE DEVICE | Site: KNEE | Status: FUNCTIONAL

## 2021-04-13 DEVICE — LEGION CR HIGH FLEX XLPE SZ 5-6 10MM
Type: IMPLANTABLE DEVICE | Site: KNEE | Status: FUNCTIONAL
Brand: LEGION

## 2021-04-13 DEVICE — LEGION CRUCIATE RETAINING OXINIUM                                    FEMORAL SIZE 7 LEFT
Type: IMPLANTABLE DEVICE | Site: KNEE | Status: FUNCTIONAL
Brand: LEGION

## 2021-04-13 DEVICE — GENESIS II NON-POROUS TIBIAL                                    BASEPLATE SIZE 6 LT
Type: IMPLANTABLE DEVICE | Site: KNEE | Status: FUNCTIONAL
Brand: GENESIS II

## 2021-04-13 DEVICE — GENESIS II RESURFACING PATELLAR 35MM
Type: IMPLANTABLE DEVICE | Site: KNEE | Status: FUNCTIONAL
Brand: GENESIS II

## 2021-04-13 RX ORDER — MELOXICAM 15 MG/1
15 TABLET ORAL ONCE
Status: COMPLETED | OUTPATIENT
Start: 2021-04-13 | End: 2021-04-13

## 2021-04-13 RX ORDER — LOSARTAN POTASSIUM 50 MG/1
100 TABLET ORAL DAILY
Status: DISCONTINUED | OUTPATIENT
Start: 2021-04-13 | End: 2021-04-13 | Stop reason: HOSPADM

## 2021-04-13 RX ORDER — ONDANSETRON 2 MG/ML
4 INJECTION INTRAMUSCULAR; INTRAVENOUS ONCE AS NEEDED
Status: DISCONTINUED | OUTPATIENT
Start: 2021-04-13 | End: 2021-04-13 | Stop reason: HOSPADM

## 2021-04-13 RX ORDER — SODIUM CHLORIDE, SODIUM LACTATE, POTASSIUM CHLORIDE, CALCIUM CHLORIDE 600; 310; 30; 20 MG/100ML; MG/100ML; MG/100ML; MG/100ML
9 INJECTION, SOLUTION INTRAVENOUS CONTINUOUS
Status: DISCONTINUED | OUTPATIENT
Start: 2021-04-13 | End: 2021-04-13 | Stop reason: HOSPADM

## 2021-04-13 RX ORDER — MIDAZOLAM HYDROCHLORIDE 1 MG/ML
1 INJECTION INTRAMUSCULAR; INTRAVENOUS
Status: DISCONTINUED | OUTPATIENT
Start: 2021-04-13 | End: 2021-04-13 | Stop reason: HOSPADM

## 2021-04-13 RX ORDER — MORPHINE SULFATE 4 MG/ML
4 INJECTION, SOLUTION INTRAMUSCULAR; INTRAVENOUS
Status: DISCONTINUED | OUTPATIENT
Start: 2021-04-13 | End: 2021-04-13 | Stop reason: HOSPADM

## 2021-04-13 RX ORDER — EPHEDRINE SULFATE 50 MG/ML
INJECTION, SOLUTION INTRAVENOUS AS NEEDED
Status: DISCONTINUED | OUTPATIENT
Start: 2021-04-13 | End: 2021-04-13 | Stop reason: SURG

## 2021-04-13 RX ORDER — HYDROCODONE BITARTRATE AND ACETAMINOPHEN 7.5; 325 MG/1; MG/1
1 TABLET ORAL EVERY 6 HOURS PRN
Start: 2021-04-18

## 2021-04-13 RX ORDER — SODIUM CHLORIDE 9 MG/ML
120 INJECTION, SOLUTION INTRAVENOUS CONTINUOUS
Status: DISCONTINUED | OUTPATIENT
Start: 2021-04-13 | End: 2021-04-13 | Stop reason: HOSPADM

## 2021-04-13 RX ORDER — ASPIRIN 325 MG
325 TABLET, DELAYED RELEASE (ENTERIC COATED) ORAL DAILY
Status: DISCONTINUED | OUTPATIENT
Start: 2021-04-14 | End: 2021-04-13 | Stop reason: HOSPADM

## 2021-04-13 RX ORDER — ACETAMINOPHEN 500 MG
1000 TABLET ORAL EVERY 8 HOURS
Refills: 0
Start: 2021-04-13 | End: 2021-04-18

## 2021-04-13 RX ORDER — LIDOCAINE HYDROCHLORIDE 10 MG/ML
0.5 INJECTION, SOLUTION EPIDURAL; INFILTRATION; INTRACAUDAL; PERINEURAL ONCE AS NEEDED
Status: COMPLETED | OUTPATIENT
Start: 2021-04-13 | End: 2021-04-13

## 2021-04-13 RX ORDER — CLINDAMYCIN PHOSPHATE 900 MG/50ML
900 INJECTION INTRAVENOUS ONCE
Status: COMPLETED | OUTPATIENT
Start: 2021-04-13 | End: 2021-04-13

## 2021-04-13 RX ORDER — OXYCODONE HYDROCHLORIDE 5 MG/1
5 TABLET ORAL EVERY 4 HOURS PRN
Status: DISCONTINUED | OUTPATIENT
Start: 2021-04-13 | End: 2021-04-13 | Stop reason: HOSPADM

## 2021-04-13 RX ORDER — ROPIVACAINE HYDROCHLORIDE 5 MG/ML
INJECTION, SOLUTION EPIDURAL; INFILTRATION; PERINEURAL AS NEEDED
Status: DISCONTINUED | OUTPATIENT
Start: 2021-04-13 | End: 2021-04-13 | Stop reason: HOSPADM

## 2021-04-13 RX ORDER — OXYCODONE HYDROCHLORIDE 5 MG/1
5 TABLET ORAL EVERY 4 HOURS PRN
Qty: 40 TABLET | Refills: 0 | Status: SHIPPED | OUTPATIENT
Start: 2021-04-13 | End: 2021-05-05

## 2021-04-13 RX ORDER — MELOXICAM 7.5 MG/1
15 TABLET ORAL DAILY
Status: DISCONTINUED | OUTPATIENT
Start: 2021-04-13 | End: 2021-04-13 | Stop reason: HOSPADM

## 2021-04-13 RX ORDER — FAMOTIDINE 10 MG/ML
20 INJECTION, SOLUTION INTRAVENOUS ONCE
Status: DISCONTINUED | OUTPATIENT
Start: 2021-04-13 | End: 2021-04-13

## 2021-04-13 RX ORDER — PROPOFOL 10 MG/ML
VIAL (ML) INTRAVENOUS AS NEEDED
Status: DISCONTINUED | OUTPATIENT
Start: 2021-04-13 | End: 2021-04-13 | Stop reason: SURG

## 2021-04-13 RX ORDER — FAMOTIDINE 20 MG/1
20 TABLET, FILM COATED ORAL ONCE
Status: COMPLETED | OUTPATIENT
Start: 2021-04-13 | End: 2021-04-13

## 2021-04-13 RX ORDER — MAGNESIUM HYDROXIDE 1200 MG/15ML
LIQUID ORAL AS NEEDED
Status: DISCONTINUED | OUTPATIENT
Start: 2021-04-13 | End: 2021-04-13 | Stop reason: HOSPADM

## 2021-04-13 RX ORDER — SODIUM CHLORIDE 0.9 % (FLUSH) 0.9 %
10 SYRINGE (ML) INJECTION AS NEEDED
Status: DISCONTINUED | OUTPATIENT
Start: 2021-04-13 | End: 2021-04-13 | Stop reason: HOSPADM

## 2021-04-13 RX ORDER — PREGABALIN 150 MG/1
150 CAPSULE ORAL ONCE
Status: COMPLETED | OUTPATIENT
Start: 2021-04-13 | End: 2021-04-13

## 2021-04-13 RX ORDER — MIDAZOLAM HYDROCHLORIDE 1 MG/ML
0.5 INJECTION INTRAMUSCULAR; INTRAVENOUS
Status: DISCONTINUED | OUTPATIENT
Start: 2021-04-13 | End: 2021-04-13 | Stop reason: HOSPADM

## 2021-04-13 RX ORDER — BUPIVACAINE HYDROCHLORIDE 2.5 MG/ML
INJECTION, SOLUTION EPIDURAL; INFILTRATION; INTRACAUDAL
Status: COMPLETED | OUTPATIENT
Start: 2021-04-13 | End: 2021-04-13

## 2021-04-13 RX ORDER — CLINDAMYCIN PHOSPHATE 900 MG/50ML
900 INJECTION INTRAVENOUS EVERY 8 HOURS
Status: DISCONTINUED | OUTPATIENT
Start: 2021-04-13 | End: 2021-04-13 | Stop reason: HOSPADM

## 2021-04-13 RX ORDER — DOCUSATE SODIUM 100 MG/1
100 CAPSULE, LIQUID FILLED ORAL 2 TIMES DAILY
Qty: 30 CAPSULE | Refills: 0 | Status: SHIPPED | OUTPATIENT
Start: 2021-04-13 | End: 2021-04-28

## 2021-04-13 RX ORDER — SODIUM CHLORIDE 0.9 % (FLUSH) 0.9 %
3 SYRINGE (ML) INJECTION EVERY 12 HOURS SCHEDULED
Status: DISCONTINUED | OUTPATIENT
Start: 2021-04-13 | End: 2021-04-13 | Stop reason: HOSPADM

## 2021-04-13 RX ORDER — ONDANSETRON 4 MG/1
4 TABLET, FILM COATED ORAL EVERY 6 HOURS PRN
Status: DISCONTINUED | OUTPATIENT
Start: 2021-04-13 | End: 2021-04-13 | Stop reason: HOSPADM

## 2021-04-13 RX ORDER — IPRATROPIUM BROMIDE AND ALBUTEROL SULFATE 2.5; .5 MG/3ML; MG/3ML
3 SOLUTION RESPIRATORY (INHALATION) ONCE AS NEEDED
Status: DISCONTINUED | OUTPATIENT
Start: 2021-04-13 | End: 2021-04-13 | Stop reason: HOSPADM

## 2021-04-13 RX ORDER — BUPIVACAINE HYDROCHLORIDE 5 MG/ML
INJECTION, SOLUTION PERINEURAL
Status: COMPLETED | OUTPATIENT
Start: 2021-04-13 | End: 2021-04-13

## 2021-04-13 RX ORDER — ASPIRIN 325 MG
325 TABLET, DELAYED RELEASE (ENTERIC COATED) ORAL DAILY
Qty: 30 TABLET | Refills: 0 | Status: SHIPPED | OUTPATIENT
Start: 2021-04-13 | End: 2021-05-13

## 2021-04-13 RX ORDER — ACETAMINOPHEN 500 MG
1000 TABLET ORAL ONCE
Status: COMPLETED | OUTPATIENT
Start: 2021-04-13 | End: 2021-04-13

## 2021-04-13 RX ORDER — MIDAZOLAM HYDROCHLORIDE 1 MG/ML
2 INJECTION INTRAMUSCULAR; INTRAVENOUS
Status: DISCONTINUED | OUTPATIENT
Start: 2021-04-13 | End: 2021-04-13 | Stop reason: HOSPADM

## 2021-04-13 RX ORDER — ONDANSETRON 2 MG/ML
4 INJECTION INTRAMUSCULAR; INTRAVENOUS EVERY 6 HOURS PRN
Status: DISCONTINUED | OUTPATIENT
Start: 2021-04-13 | End: 2021-04-13 | Stop reason: HOSPADM

## 2021-04-13 RX ORDER — DEXAMETHASONE SODIUM PHOSPHATE 4 MG/ML
INJECTION, SOLUTION INTRA-ARTICULAR; INTRALESIONAL; INTRAMUSCULAR; INTRAVENOUS; SOFT TISSUE AS NEEDED
Status: DISCONTINUED | OUTPATIENT
Start: 2021-04-13 | End: 2021-04-13 | Stop reason: SURG

## 2021-04-13 RX ORDER — HYDROMORPHONE HYDROCHLORIDE 1 MG/ML
0.5 INJECTION, SOLUTION INTRAMUSCULAR; INTRAVENOUS; SUBCUTANEOUS
Status: DISCONTINUED | OUTPATIENT
Start: 2021-04-13 | End: 2021-04-13 | Stop reason: HOSPADM

## 2021-04-13 RX ORDER — NALOXONE HCL 0.4 MG/ML
0.1 VIAL (ML) INJECTION
Status: DISCONTINUED | OUTPATIENT
Start: 2021-04-13 | End: 2021-04-13 | Stop reason: HOSPADM

## 2021-04-13 RX ORDER — GABAPENTIN 100 MG/1
100 CAPSULE ORAL 3 TIMES DAILY PRN
Status: DISCONTINUED | OUTPATIENT
Start: 2021-04-13 | End: 2021-04-13 | Stop reason: HOSPADM

## 2021-04-13 RX ORDER — SODIUM CHLORIDE 0.9 % (FLUSH) 0.9 %
10 SYRINGE (ML) INJECTION EVERY 12 HOURS SCHEDULED
Status: DISCONTINUED | OUTPATIENT
Start: 2021-04-13 | End: 2021-04-13 | Stop reason: HOSPADM

## 2021-04-13 RX ORDER — MELOXICAM 15 MG/1
15 TABLET ORAL DAILY
Qty: 15 TABLET | Refills: 0 | Status: SHIPPED | OUTPATIENT
Start: 2021-04-13 | End: 2021-04-28

## 2021-04-13 RX ORDER — LIDOCAINE HYDROCHLORIDE 10 MG/ML
INJECTION, SOLUTION EPIDURAL; INFILTRATION; INTRACAUDAL; PERINEURAL AS NEEDED
Status: DISCONTINUED | OUTPATIENT
Start: 2021-04-13 | End: 2021-04-13 | Stop reason: SURG

## 2021-04-13 RX ORDER — ONDANSETRON 2 MG/ML
INJECTION INTRAMUSCULAR; INTRAVENOUS AS NEEDED
Status: DISCONTINUED | OUTPATIENT
Start: 2021-04-13 | End: 2021-04-13 | Stop reason: SURG

## 2021-04-13 RX ORDER — SODIUM CHLORIDE 0.9 % (FLUSH) 0.9 %
1-10 SYRINGE (ML) INJECTION AS NEEDED
Status: DISCONTINUED | OUTPATIENT
Start: 2021-04-13 | End: 2021-04-13 | Stop reason: HOSPADM

## 2021-04-13 RX ORDER — LABETALOL HYDROCHLORIDE 5 MG/ML
10 INJECTION, SOLUTION INTRAVENOUS EVERY 4 HOURS PRN
Status: DISCONTINUED | OUTPATIENT
Start: 2021-04-13 | End: 2021-04-13 | Stop reason: HOSPADM

## 2021-04-13 RX ORDER — NALOXONE HCL 0.4 MG/ML
0.4 VIAL (ML) INJECTION
Status: DISCONTINUED | OUTPATIENT
Start: 2021-04-13 | End: 2021-04-13 | Stop reason: HOSPADM

## 2021-04-13 RX ADMIN — MELOXICAM 15 MG: 15 TABLET ORAL at 06:29

## 2021-04-13 RX ADMIN — Medication 1000 MG: at 08:29

## 2021-04-13 RX ADMIN — PROPOFOL 50 MG: 10 INJECTION, EMULSION INTRAVENOUS at 07:28

## 2021-04-13 RX ADMIN — LIDOCAINE HYDROCHLORIDE 0.5 ML: 10 INJECTION, SOLUTION EPIDURAL; INFILTRATION; INTRACAUDAL; PERINEURAL at 06:46

## 2021-04-13 RX ADMIN — OXYCODONE HYDROCHLORIDE 5 MG: 5 TABLET ORAL at 15:31

## 2021-04-13 RX ADMIN — OXYCODONE HYDROCHLORIDE 5 MG: 5 TABLET ORAL at 11:36

## 2021-04-13 RX ADMIN — ACETAMINOPHEN 1000 MG: 500 TABLET, FILM COATED ORAL at 06:29

## 2021-04-13 RX ADMIN — LIDOCAINE HYDROCHLORIDE 50 MG: 10 INJECTION, SOLUTION EPIDURAL; INFILTRATION; INTRACAUDAL; PERINEURAL at 07:28

## 2021-04-13 RX ADMIN — VANCOMYCIN HYDROCHLORIDE 1750 MG: 10 INJECTION, POWDER, LYOPHILIZED, FOR SOLUTION INTRAVENOUS at 15:25

## 2021-04-13 RX ADMIN — BUPIVACAINE HYDROCHLORIDE 30 ML: 2.5 INJECTION, SOLUTION EPIDURAL; INFILTRATION; INTRACAUDAL; PERINEURAL at 09:27

## 2021-04-13 RX ADMIN — EPHEDRINE SULFATE 10 MG: 50 INJECTION, SOLUTION INTRAVENOUS at 07:51

## 2021-04-13 RX ADMIN — DEXAMETHASONE SODIUM PHOSPHATE 8 MG: 4 INJECTION, SOLUTION INTRA-ARTICULAR; INTRALESIONAL; INTRAMUSCULAR; INTRAVENOUS; SOFT TISSUE at 07:32

## 2021-04-13 RX ADMIN — CLINDAMYCIN PHOSPHATE 900 MG: 900 INJECTION, SOLUTION INTRAVENOUS at 07:43

## 2021-04-13 RX ADMIN — PROPOFOL 50 MG: 10 INJECTION, EMULSION INTRAVENOUS at 07:32

## 2021-04-13 RX ADMIN — Medication 1000 MG: at 07:35

## 2021-04-13 RX ADMIN — PROPOFOL 150 MCG/KG/MIN: 10 INJECTION, EMULSION INTRAVENOUS at 07:32

## 2021-04-13 RX ADMIN — CLINDAMYCIN PHOSPHATE 900 MG: 900 INJECTION, SOLUTION INTRAVENOUS at 13:35

## 2021-04-13 RX ADMIN — SODIUM CHLORIDE 120 ML/HR: 9 INJECTION, SOLUTION INTRAVENOUS at 11:07

## 2021-04-13 RX ADMIN — MELOXICAM 15 MG: 7.5 TABLET ORAL at 11:08

## 2021-04-13 RX ADMIN — PREGABALIN 150 MG: 150 CAPSULE ORAL at 06:30

## 2021-04-13 RX ADMIN — ONDANSETRON 4 MG: 2 INJECTION INTRAMUSCULAR; INTRAVENOUS at 08:54

## 2021-04-13 RX ADMIN — BUPIVACAINE HYDROCHLORIDE 2 ML: 5 INJECTION, SOLUTION PERINEURAL at 07:30

## 2021-04-13 RX ADMIN — FAMOTIDINE 20 MG: 20 TABLET ORAL at 06:46

## 2021-04-13 RX ADMIN — ROPIVACAINE HYDROCHLORIDE 10 ML/HR: 2 INJECTION, SOLUTION EPIDURAL; INFILTRATION at 09:34

## 2021-04-13 RX ADMIN — SODIUM CHLORIDE, POTASSIUM CHLORIDE, SODIUM LACTATE AND CALCIUM CHLORIDE 9 ML/HR: 600; 310; 30; 20 INJECTION, SOLUTION INTRAVENOUS at 06:46

## 2021-04-13 RX ADMIN — MUPIROCIN 1 APPLICATION: 20 OINTMENT TOPICAL at 06:30

## 2021-04-13 RX ADMIN — VANCOMYCIN HYDROCHLORIDE 1750 MG: 10 INJECTION, POWDER, LYOPHILIZED, FOR SOLUTION INTRAVENOUS at 06:40

## 2021-04-13 NOTE — H&P
Pre-Op H&P  Artur  Maral  [id number]  1955      Chief complaint: Left knee pain      Subjective:  Patient is a 66 y.o.male presents for scheduled surgery by Dr. Kline. He anticipates a LEFT TOTAL KNEE ARTHROPLASTY today. His knee has been painful for many years. He uses a cane occasionally for ambulation. He denies recent falls. Conservative treatments failed to provide lasting benefits.      Review of Systems:  Constitutional-- No fever, chills or sweats. No fatigue.  CV-- No chest pain, palpitation or syncope. +HTN, HLD  Resp-- No SOB, cough, hemoptysis  Skin--No rashes or lesions      Allergies:   Allergies   Allergen Reactions   • Keflex [Cephalexin] Hives and Rash         Home Meds:  Medications Prior to Admission   Medication Sig Dispense Refill Last Dose   • atorvastatin (LIPITOR) 80 MG tablet Take 80 mg by mouth Every Night.   4/13/2021 at 0400   • bisoprolol (ZEBeta) 10 MG tablet Take 10 mg by mouth Daily.   4/13/2021 at 0400   • Chlorhexidine Gluconate 4 % solution Apply  topically to the appropriate area as directed Daily. 237 mL 0 4/13/2021 at 0400   • Etanercept (ENBREL SC) Inject  under the skin into the appropriate area as directed 1 (One) Time Per Week.   Past Month at Unknown time   • gabapentin (NEURONTIN) 100 MG capsule Take 100 mg by mouth 3 (Three) Times a Day As Needed.   4/13/2021 at 0400   • HYDROcodone-acetaminophen (NORCO) 7.5-325 MG per tablet Take 1 tablet by mouth Every 6 (Six) Hours As Needed.   4/12/2021 at 2000   • levothyroxine (SYNTHROID, LEVOTHROID) 75 MCG tablet Take 75 mcg by mouth Daily.   4/13/2021 at 0400   • losartan (COZAAR) 100 MG tablet Take 100 mg by mouth Daily.   4/12/2021 at 2000   • mupirocin (BACTROBAN) 2 % ointment place into the nostril(s) as directed by provider 2 (Two) Times a Day. 22 g 0 4/12/2021 at 2030         PMH:   Past Medical History:   Diagnosis Date   • Disease of thyroid gland    • Hyperlipidemia    • Hypertension    • Rheumatoid arthritis  "(CMS/[facility])    • Wears reading eyeglasses      PSH:    Past Surgical History:   Procedure Laterality Date   • COLONOSCOPY     • KNEE SURGERY Right    • OTHER SURGICAL HISTORY      fistula urgery with Leann 1985 approx       Immunization History:  Influenza: 2020  Pneumococcal: UTD  Tetanus: UTD  Covid x2:     Social History:   Tobacco:   Social History     Tobacco Use   Smoking Status Former Smoker   • Packs/day: 2.00   • Years: 30.00   • Pack years: 60.00   • Types: Cigarettes   • Quit date:    • Years since quittin.2   Smokeless Tobacco Never Used      Alcohol:     Social History     Substance and Sexual Activity   Alcohol Use Yes   • Alcohol/week: 4.0 - 5.0 standard drinks   • Types: 4 - 5 Cans of beer per week         Physical Exam:/84 (BP Location: Right arm, Patient Position: Lying)   Pulse 68   Temp 97.9 °F (36.6 °C) (Temporal)   Resp 18   Ht 177.8 cm (70\")   Wt 121 kg (267 lb)   SpO2 97%   BMI 38.31 kg/m²       General Appearance:    Alert, cooperative, no distress, appears stated age   Head:    Normocephalic, without obvious abnormality, atraumatic   Lungs:     Clear to auscultation bilaterally, respirations unlabored    Heart:   Regular rate and rhythm, S1 and S2 normal    Abdomen:    Soft without tenderness   Extremities:   Extremities normal, atraumatic, no cyanosis or edema   Skin:   Skin color, texture, turgor normal, no rashes or lesions   Neurologic:   Grossly intact     Results Review:     LABS:  Lab Results   Component Value Date    WBC 4.86 2021    HGB 14.0 2021    HCT 41.7 2021    MCV 91.6 2021     2021    NEUTROABS 2.85 2021    GLUCOSE 103 (H) 2021    BUN 17 2021    CREATININE 1.24 2021    EGFRIFNONA 59 (L) 2021     2021    K 4.1 2021     2021    CO2 25.0 2021    CALCIUM 9.5 2021       RADIOLOGY:  21 Knee Xray:  Right Knee Radiographs  Indication: right " knee pain  Views: Standing AP's and skiers of both knees, with lateral and sunrise views of the right knee     Comparison: 5/15/2019     Findings:   Bone-on-bone contact both medially and laterally, with tricompartmental osteophytes, no acute bony abnormalities.  Worsening compared to previous films.  No unusual bony features.     Left Knee Radiographs  Indication: left knee pain  Views: Standing AP's and skiers of both knees, with lateral and sunrise views of the left knee     Comparison: 5/15/2019     Findings:   Bone-on-bone contact both medially and laterally, with tricompartmental osteophytes, no acute bony abnormalities.  Worsening compared to previous films.  No unusual bony features.  I reviewed the patient's new clinical results.    Cancer Staging (if applicable)  Cancer Patient: __ yes __no __unknown; If yes, clinical stage T:__ N:__M:__, stage group or __N/A      Impression: Primary osteoarthritis left knee      Plan: LEFT TOTAL KNEE ARTHROPLASTY      FERNANDA Silver   4/13/2021   06:49 EDT     Agree with above - plan for left TKA    Edenilson Kline MD  04/13/21  07:18 EDT

## 2021-04-13 NOTE — OP NOTE
DATE OF PROCEDURE: 21    PREOPERATIVE DIAGNOSIS: left knee arthritis      POSTOPERATIVE DIAGNOSIS:  left knee arthritis    PROCEDURES PERFORMED:   left total knee arthroplasty with [employer] components (#7 cruciate retaining femur, #6 tibia, 10 mm polyethylene, with 35 three peg patella).     SURGEON: Edenilson Kline MD    ASSISTANT: Twila Walker PA-C  (Twila Walker PA-C was present and necessary for positioning, draping, retraction, instrumentation and closure.)    SPECIMENS: None    IMPLANTS:   Implants     Implant    Cmt Bone Simplex/P Full Dose 10/Pk - [id number] - Implanted   (Left) Knee    Inventory item: CMT BONE SIMPLEX/P FULL DOSE 10/PK Model/Cat number: [id number]    : [employer] Lot number: [id number]    As of 2021     Status: Implanted                  Cmt Bone Simplex/P Full Dose 10/Pk - [id number] - Implanted   (Left) Knee    Inventory item: CMT BONE SIMPLEX/P FULL DOSE 10/PK Model/Cat number: [id number]    : [employer] Lot number: [id number]    As of 2021     Status: Implanted                  Sut Contrl Tiss Stratafix Spiral Mncryl Ud 3/0 Pls 60cm - [id number] - Implanted   (Left) Knee    Inventory item: SUT CONTRL TISS STRATAFIX SPIRAL MNCRYL UD 3/0 PLS 60CM Model/Cat number: [id number]    : "[employer] ENDO SURGERY  DIV OF J AND J Lot number: [id number]    As of 2021     Status: Implanted                  Sut Contrl Tiss Stratafix Symm Pds Plus Yadira Ct-1 45cm - [id number] - Implanted   (Left) Knee    Inventory item: SUT CONTRL TISS STRATAFIX SYMM PDS PLUS YADIRA CT-1 45CM Model/Cat number: [id number]    : [employer]  DIV OF J AND J Lot number: [id number]    As of 2021     Status: Implanted                  Pat Gen2 Resrf 35mm - [id number] - Implanted   (Left) Knee    Inventory item: PAT GEN2 RESRF 35MM Model/Cat number: [id number]    : [employer] Lot number: [id number]    As of 2021     Status: Implanted                   Base Tib/Kn Gen2 Nonpor Ti Sz6 Lt - [id number] - Implanted   (Left) Knee    Inventory item: BASE TIB/KN GEN2 NONPOR TI SZ6 LT Model/Cat number: [id number]    : [employer] Lot number: [id number]    As of 4/13/2021     Status: Implanted                  Comp Fem Legion Oxinium Cr Sz7 Lt - [id number] - Implanted   (Left) Knee    Inventory item: COMP FEM LEGION OXINIUM CR SZ7 LT Model/Cat number: [id number]    : [employer] Lot number: [id number]    As of 4/13/2021     Status: Implanted                  Insrt Art Legion Cr Hf Xlpe Sz5to6 10mm - [id number] - Implanted   (Left) Knee    Inventory item: INSRT ART LEGION CR HF XLPE SZ5TO6 10MM Model/Cat number: [id number]    : [employer] Lot number: [id number]    As of 4/13/2021     Status: Implanted                         ANESTHESIA:  Spinal    STAFF:  Circulator: Anabell Hobson RN  Scrub Person: Lorna Dubose  Vendor Representative: Cristino Bruno Assistant: Danilo Yang PCT; Shayy Lindsey  Assistant: Twila Walker PA-C    TOURNIQUET TIME: 16 minutes    ESTIMATED BLOOD LOSS: 150 mL     COMPLICATIONS: None    PREOPERATIVE ANTIBIOTICS: Clindamycin and vancomycin    INDICATIONS: The patient is a 66 y.o. male with debilitating left knee pain secondary to osteoarthritis that failed to improve in spite of conservative treatment .  Options have been discussed at length with the patient and the patient has had an extended course of conservative treatment without long-term benefit. The patient has reached the point where the patient desires total knee arthroplasty surgery and understands the risks, benefits, and alternatives. Consent was obtained. Please see my office notes for details with regard to preoperative counseling and operative rationale.     DESCRIPTION OF PROCEDURE: The patient was positively identified in the preoperative holding area and brought to the operating suite and placed in a  supine position. After adequate spinal anesthetic had been achieved, the left lower extremity was prepped and draped in the usual sterile fashion.  After application of a tourniquet to the left upper thigh, which was used during the procedure for a total 16 minutes during the cementation process only. Landmarks of the knee were identified and timeout procedure was performed to confirm the operative site, as well as other parameters. Following the sterile prep and drape, a skin incision was made just off the medial aspect of midline for a medial parapatellar approach. Following a sharp skin incision, dissection was carried down to the level of the extensor mechanism and a medial parapatellar arthrotomy was made and the patella was tucked into the lateral gutter. Description of arthritis: Bone-on-bone contact medial compartment, tricompartmental osteophytes, varus alignment.  The knee was adequately exposed and a distal femoral cut was made with an intramedullary guide. This was subsequently sized for a #7 implant and anterior, posterior chamfer cuts made. The menisci removed both medially and laterally.  The ACL was transected and the tibia was subluxed anteriorly. Proximal tibia cut was made with the external alignment guide. The cut was noted to be perpendicular to the tibial axis, with symmetric flexion and extension gaps. Therefore, final tibial preparations to accommodate a #6 tibia were made, followed by final femoral preparations. With a trial 10 insert in place, full flexion and extension was noted with no instability. The patella was then prepared for a 35 three peg patella which had excellent tracking. All trial components were removed and final components were cemented in place with namely a #6 tibia, #7 cruciate retaining femur and a 35 three peg patella with a trial 11 insert for cement compression. All the excess cement was removed from the bone implant interface and allowed to harden. Tourniquet was  deflated. Hemostasis was obtained with electrocautery. There was no brisk bleeding noted in the popliteal fossa in particular. Therefore, the knee was copiously irrigated as it was between major steps, and the final 10 insert was placed as this was deemed appropriate for the patient's anatomy with full flexion and extension and no instability and attention was then directed towards closure. The medial parapatellar arthrotomy was closed with #1 Vicryl in an interrupted figure-of-eight fashion in 4 strategic locations followed by oversewing this from proximal to distal with a #1 StrataFix symmetric, which nicely sealed the joint, followed by closure of the deep fascial layer with #1 Vicryl in a buried interrupted fashion, followed by closure of the subcutaneous layer with 2-0 Vicryl and the skin with 3-0 Stratafix in a running subcuticular fashion.  Adhesive wound closure dressing was applied followed by a sterile dressing with 4 x 4's, abdominal pad, soft roll and Ace wrap. The patient tolerated the procedure well and was brought to the recovery room in good condition.     PLAN:  1.  The patient will begin early range of motion and weight-bearing per the post total knee arthroplasty protocol.   2.  I anticipate brief hospitalization for initial rehabilitation and pain control followed by continued rehabilitation home health or outpatient physical therapy setting.  Patient may be a candidate for same-day discharge if his pain is controlled, cleared by therapy, and stable medically.  Follow-up with me in 3 weeks as planned.   3.  Postoperative medical management with Dr. Cherry.  4.  Aspirin will be utilized for DVT prophylaxis.       Edenilson Kline MD  04/13/21  09:05 EDT

## 2021-04-13 NOTE — PLAN OF CARE
Problem: Adult Inpatient Plan of Care  Goal: Plan of Care Review  Flowsheets (Taken 4/13/2021 1310)  Progress: improving  Plan of Care Reviewed With:   patient   spouse  Outcome Summary: PT eval complete. Pt ambulated 360 feet using RW, CGA, and one person to manage equipment. Gait limited by fatigue. Bed mobility performed with supervision and STS with CGA. No knee buckling noted with ambulation. Pt IND with SLR. Will assess L knee AROM POD#1 if pt doesn't d/c today. Reviewed HEP and knee precautions via handout. Educated on safe car transfers. PADD score = 10. ADLs assessed, pt does not require OT eval prior to potential d/c today. Functionally, pt safe to d/c home with assist today from a PT perspective. Recommend HHPT.   Goal Outcome Evaluation:  Plan of Care Reviewed With: patient, spouse  Progress: improving  Outcome Summary: PT eval complete. Pt ambulated 360 feet using RW, CGA, and one person to manage equipment. Gait limited by fatigue. Bed mobility performed with supervision and STS with CGA. No knee buckling noted with ambulation. Pt IND with SLR. Will assess L knee AROM POD#1 if pt doesn't d/c today. Reviewed HEP and knee precautions via handout. Educated on safe car transfers. PADD score = 10. ADLs assessed, pt does not require OT eval prior to potential d/c today. Functionally, pt safe to d/c home with assist today from a PT perspective. Recommend HHPT.

## 2021-04-13 NOTE — DISCHARGE PLACEMENT REQUEST
"Nicolasa Cottrell Mc (66 y.o. Male)     Date of Birth Social Security Number Address Home Phone MRN    1955  [address] [place] [address] [phone] [id number]    Congregational Marital Status          Unknown        Admission Date Admission Type Admitting Provider Attending Provider Department, Room/Bed    21 Elective Edenilson Kline MD Kirk, Michael E, MD [address], S373/1    Discharge Date Discharge Disposition Discharge Destination                       Attending Provider: Edenilson Kline MD    Allergies: Keflex [Cephalexin]    Isolation: None   Infection: None   Code Status: CPR    Ht: 177.8 cm (70\")   Wt: 121 kg (267 lb)    Admission Cmt: None   Principal Problem: S/P total knee arthroplasty, left [Z96.652]                 Active Insurance as of 2021     Primary Coverage     Payor Plan Insurance Group Employer/Plan Group    HUMANA MEDICARE REPLACEMENT HUMANA MEDICARE REPLACEMENT [id number]     Payor Plan Address Payor Plan Phone Number Payor Plan Fax Number Effective Dates    [address] [address] [phone]  2018 - None Entered    [facility] [address]       Subscriber Name Subscriber Birth Date Member ID       NICOLASA COTTRELL MC 1955 [id number]                 Emergency Contacts      (Rel.) Home Phone Work Phone Mobile Phone    yury cottrell (Spouse) [phone] -- --            Insurance Information                HUMANA MEDICARE REPLACEMENT/HUMANA MEDICARE REPLACEMENT Phone: [phone]    Subscriber: Nicolasa Cottrell Mc Subscriber#: [id number]    Group#: [id number] Precert#:              History & Physical      Dedrick Cherry MD at 21 1218          Patient Name: Nicolasa Pearson  MRN: [id number]  : 1955  DOS: 2021    Attending: Edenilson Kline MD    Primary Care Provider: Derrick Acosta MD      Chief complaint: Left knee Pain.    Subjective   Patient is a pleasant 66 y.o. male presented for scheduled surgery by Dr." Eliud.    Per his note (The patient is a 66 y.o. male with debilitating left knee pain secondary to osteoarthritis that failed to improve in spite of conservative treatment .  Options have been discussed at length with the patient and the patient has had an extended course of conservative treatment without long-term benefit. The patient has reached the point where the patient desires total knee arthroplasty surgery and understands the risks, benefits, and alternatives. Consent was obtained. Please see my office notes for details with regard to preoperative counseling and operative rationale.).    Patient has history of rheumatoid arthritis, is on Enbrel, is followed by rheumatology clinic at .  Enbrel has been on hold for 2 weeks with planned with holding for 2 more weeks following surgery.    Patient underwent left total knee arthroplasty under spinal anesthesia, tolerated surgery well.  Adductor canal nerve block catheter was placed by acute pain service.    Seen in his room postoperatively, he is doing fairly well.  Good pain control.  No nausea, vomiting, or shortness of breath.  He has no history of DVT or PE.       Allergies   Allergen Reactions   • Keflex [Cephalexin] Hives and Rash       Meds:  Medications Prior to Admission   Medication Sig Dispense Refill Last Dose   • atorvastatin (LIPITOR) 80 MG tablet Take 80 mg by mouth Every Night.   4/13/2021 at 0400   • bisoprolol (ZEBeta) 10 MG tablet Take 10 mg by mouth Daily.   4/13/2021 at 0400   • Chlorhexidine Gluconate 4 % solution Apply  topically to the appropriate area as directed Daily. 237 mL 0 4/13/2021 at 0400   • Etanercept (ENBREL SC) Inject  under the skin into the appropriate area as directed 1 (One) Time Per Week.   Past Month at Unknown time   • gabapentin (NEURONTIN) 100 MG capsule Take 100 mg by mouth 3 (Three) Times a Day As Needed.   4/13/2021 at 0400   • HYDROcodone-acetaminophen (NORCO) 7.5-325 MG per tablet Take 1 tablet by mouth Every 6  "(Six) Hours As Needed.   2021 at 2000   • levothyroxine (SYNTHROID, LEVOTHROID) 75 MCG tablet Take 75 mcg by mouth Daily.   2021 at 0400   • losartan (COZAAR) 100 MG tablet Take 100 mg by mouth Daily.   2021 at 2000   • mupirocin (BACTROBAN) 2 % ointment place into the nostril(s) as directed by provider 2 (Two) Times a Day. 22 g 0 2021 at 2030       Past Medical History:   Diagnosis Date   • Disease of thyroid gland    • Hyperlipidemia    • Hypertension    • Rheumatoid arthritis (CMS/HCC)    • Wears reading eyeglasses      Past Surgical History:   Procedure Laterality Date   • COLONOSCOPY     • KNEE SURGERY Right    • OTHER SURGICAL HISTORY      fistula urgery with Leann  approx     Family History   Problem Relation Age of Onset   • No Known Problems Mother    • Heart attack Father      Social History     Tobacco Use   • Smoking status: Former Smoker     Packs/day: 2.00     Years: 30.00     Pack years: 60.00     Types: Cigarettes     Quit date:      Years since quittin.2   • Smokeless tobacco: Never Used   Substance Use Topics   • Alcohol use: Yes     Alcohol/week: 4.0 - 5.0 standard drinks     Types: 4 - 5 Cans of beer per week   • Drug use: No   , retired.    Review of Systems  Pertinent items are noted in HPI, all other systems reviewed and negative    Vital Signs  /70 (BP Location: Right arm, Patient Position: Lying)   Pulse 61   Temp 97.6 °F (36.4 °C) (Axillary)   Resp 16   Ht 177.8 cm (70\")   Wt 121 kg (267 lb)   SpO2 96%   BMI 38.31 kg/m²     Physical Exam:    General Appearance:    Alert, cooperative, in no acute distress   Head:    Normocephalic, without obvious abnormality, atraumatic   Eyes:            Lids and lashes normal, conjunctivae and sclerae normal, no   icterus, no pallor, corneas clear    Ears:    Ears appear intact with no abnormalities noted   Throat:   No oral lesions, no thrush, oral mucosa moist   Neck:   No adenopathy, supple, " trachea midline, no thyromegaly         Lungs:     Clear to auscultation,respirations regular, even and                   unlabored    Heart:    Regular rhythm and normal rate, normal S1 and S2, no       murmur, no gallop   Abdomen:     Normal bowel sounds, no masses, no organomegaly, soft        non-tender, non-distended, no guarding, no rebound                 tenderness   Genitalia:    Deferred   Extremities:  Left LE, CDI dressing on knee, PNB cath present.    Pulses:   Pulses palpable and equal bilaterally   Skin:   No bleeding, bruising or rash   Neurologic:   Cranial nerves 2 - 12 grossly intact, intact flexion dorsiflexion bilateral feet      I reviewed the patient's new clinical results.             Invalid input(s): NEUTOPHILPCT,  EOSPCT        Invalid input(s): LABALBU, PROT  Lab Results   Component Value Date    HGBA1C 5.30 04/01/2021     Results for NICOLASA FAROOQ MC (MRN [id number]) as of 4/13/2021 12:18   Ref. Range 4/1/2021 13:26   Glucose Latest Ref Range: 65 - 99 mg/dL 103 (H)   Sodium Latest Ref Range: 136 - 145 mmol/L 137   Potassium Latest Ref Range: 3.5 - 5.2 mmol/L 4.1   CO2 Latest Ref Range: 22.0 - 29.0 mmol/L 25.0   Chloride Latest Ref Range: 98 - 107 mmol/L 100   Anion Gap Latest Ref Range: 5.0 - 15.0 mmol/L 12.0   Creatinine Latest Ref Range: 0.76 - 1.27 mg/dL 1.24   BUN Latest Ref Range: 8 - 23 mg/dL 17   BUN/Creatinine Ratio Latest Ref Range: 7.0 - 25.0  13.7   Calcium Latest Ref Range: 8.6 - 10.5 mg/dL 9.5   eGFR Non African Am Latest Ref Range: >60 mL/min/1.73 59 (L)   Hemoglobin A1C Latest Ref Range: 4.80 - 5.60 % 5.30   C-Reactive Protein Latest Ref Range: 0.00 - 0.50 mg/dL 0.54 (H)   Protime Latest Ref Range: 11.5 - 14.0 Seconds 13.6   INR Latest Ref Range: 0.85 - 1.16  1.07   PTT Latest Ref Range: 24.0 - 37.0 seconds 32.4   WBC Latest Ref Range: 3.40 - 10.80 10*3/mm3 4.86   RBC Latest Ref Range: 4.14 - 5.80 10*6/mm3 4.55   Hemoglobin Latest Ref Range: 13.0 - 17.7 g/dL 14.0    Hematocrit Latest Ref Range: 37.5 - 51.0 % 41.7   RDW Latest Ref Range: 12.3 - 15.4 % 12.9   MCV Latest Ref Range: 79.0 - 97.0 fL 91.6   MCH Latest Ref Range: 26.6 - 33.0 pg 30.8   MCHC Latest Ref Range: 31.5 - 35.7 g/dL 33.6   MPV Latest Ref Range: 6.0 - 12.0 fL 10.1   Platelets Latest Ref Range: 140 - 450 10*3/mm3 158         Assessment and Plan:       S/P total knee arthroplasty, left    Primary osteoarthritis of left knee    HTN (hypertension)    Hyperlipidemia    Obesity (BMI 30-39.9)      Plan  1. PT/OT,  Weight bearing as tolerated left LE  2. Pain control-prns, ACB cath with ropivacaine infusion.  3. IS-encourage  4. DVT proph- Mechanicals and aspirin  5. Bowel regimen  6. Resume home medications as appropriate  7. Monitor post-op labs  8. DC planning for home    Patient is very motivated to achieve his goals with PT, achieve good pain control, and be discharged home later today.    I reviewed with him regarding postop management goals, medications at time of discharge if he is able to be discharged home today including goals for DVT prophylaxis, pain control, and bowel regimen..    Patient and his wife expressed understanding and agreement.    Dragon disclaimer:  Part of this encounter note is an electronic transcription/translation of spoken language to printed text. The electronic translation of spoken language may permit erroneous, or at times, nonsensical words or phrases to be inadvertently transcribed; Although I have reviewed the note for such errors, some may still exist.    Dedrick Cherry MD  04/13/21  12:18 EDT            Electronically signed by Dedrick Cherry MD at 04/13/21 1308     Edenilson Kline MD at 04/13/21 0649            Pre-Op H&P  [facility]  [id number]  1955      Chief complaint: Left knee pain      Subjective:  Patient is a 66 y.o.male presents for scheduled surgery by Dr. Kline. He anticipates a LEFT TOTAL KNEE ARTHROPLASTY today. His knee has been painful for  many years. He uses a cane occasionally for ambulation. He denies recent falls. Conservative treatments failed to provide lasting benefits.      Review of Systems:  Constitutional-- No fever, chills or sweats. No fatigue.  CV-- No chest pain, palpitation or syncope. +HTN, HLD  Resp-- No SOB, cough, hemoptysis  Skin--No rashes or lesions      Allergies:   Allergies   Allergen Reactions   • Keflex [Cephalexin] Hives and Rash         Home Meds:  Medications Prior to Admission   Medication Sig Dispense Refill Last Dose   • atorvastatin (LIPITOR) 80 MG tablet Take 80 mg by mouth Every Night.   4/13/2021 at 0400   • bisoprolol (ZEBeta) 10 MG tablet Take 10 mg by mouth Daily.   4/13/2021 at 0400   • Chlorhexidine Gluconate 4 % solution Apply  topically to the appropriate area as directed Daily. 237 mL 0 4/13/2021 at 0400   • Etanercept (ENBREL SC) Inject  under the skin into the appropriate area as directed 1 (One) Time Per Week.   Past Month at Unknown time   • gabapentin (NEURONTIN) 100 MG capsule Take 100 mg by mouth 3 (Three) Times a Day As Needed.   4/13/2021 at 0400   • HYDROcodone-acetaminophen (NORCO) 7.5-325 MG per tablet Take 1 tablet by mouth Every 6 (Six) Hours As Needed.   4/12/2021 at 2000   • levothyroxine (SYNTHROID, LEVOTHROID) 75 MCG tablet Take 75 mcg by mouth Daily.   4/13/2021 at 0400   • losartan (COZAAR) 100 MG tablet Take 100 mg by mouth Daily.   4/12/2021 at 2000   • mupirocin (BACTROBAN) 2 % ointment place into the nostril(s) as directed by provider 2 (Two) Times a Day. 22 g 0 4/12/2021 at 2030         PMH:   Past Medical History:   Diagnosis Date   • Disease of thyroid gland    • Hyperlipidemia    • Hypertension    • Rheumatoid arthritis (CMS/HCC)    • Wears reading eyeglasses      PSH:    Past Surgical History:   Procedure Laterality Date   • COLONOSCOPY     • KNEE SURGERY Right 1978   • OTHER SURGICAL HISTORY      fistula urgery with Leann 1985 approx       Immunization History:  Influenza:  "2020  Pneumococcal: UTD  Tetanus: UTD  Covid x2:     Social History:   Tobacco:   Social History     Tobacco Use   Smoking Status Former Smoker   • Packs/day: 2.00   • Years: 30.00   • Pack years: 60.00   • Types: Cigarettes   • Quit date:    • Years since quittin.2   Smokeless Tobacco Never Used      Alcohol:     Social History     Substance and Sexual Activity   Alcohol Use Yes   • Alcohol/week: 4.0 - 5.0 standard drinks   • Types: 4 - 5 Cans of beer per week         Physical Exam:/84 (BP Location: Right arm, Patient Position: Lying)   Pulse 68   Temp 97.9 °F (36.6 °C) (Temporal)   Resp 18   Ht 177.8 cm (70\")   Wt 121 kg (267 lb)   SpO2 97%   BMI 38.31 kg/m²       General Appearance:    Alert, cooperative, no distress, appears stated age   Head:    Normocephalic, without obvious abnormality, atraumatic   Lungs:     Clear to auscultation bilaterally, respirations unlabored    Heart:   Regular rate and rhythm, S1 and S2 normal    Abdomen:    Soft without tenderness   Extremities:   Extremities normal, atraumatic, no cyanosis or edema   Skin:   Skin color, texture, turgor normal, no rashes or lesions   Neurologic:   Grossly intact     Results Review:     LABS:  Lab Results   Component Value Date    WBC 4.86 2021    HGB 14.0 2021    HCT 41.7 2021    MCV 91.6 2021     2021    NEUTROABS 2.85 2021    GLUCOSE 103 (H) 2021    BUN 17 2021    CREATININE 1.24 2021    EGFRIFNONA 59 (L) 2021     2021    K 4.1 2021     2021    CO2 25.0 2021    CALCIUM 9.5 2021       RADIOLOGY:  21 Knee Xray:  Right Knee Radiographs  Indication: right knee pain  Views: Standing AP's and skiers of both knees, with lateral and sunrise views of the right knee     Comparison: 5/15/2019     Findings:   Bone-on-bone contact both medially and laterally, with tricompartmental osteophytes, no acute bony abnormalities. "  Worsening compared to previous films.  No unusual bony features.     Left Knee Radiographs  Indication: left knee pain  Views: Standing AP's and skiers of both knees, with lateral and sunrise views of the left knee     Comparison: 5/15/2019     Findings:   Bone-on-bone contact both medially and laterally, with tricompartmental osteophytes, no acute bony abnormalities.  Worsening compared to previous films.  No unusual bony features.  I reviewed the patient's new clinical results.    Cancer Staging (if applicable)  Cancer Patient: __ yes __no __unknown; If yes, clinical stage T:__ N:__M:__, stage group or __N/A      Impression: Primary osteoarthritis left knee      Plan: LEFT TOTAL KNEE ARTHROPLASTY      [place] FERNANDA Mccauley   4/13/2021   06:49 EDT     Agree with above - plan for left TKA    Edenilson Kline MD  04/13/21  07:18 EDT      Electronically signed by Edenilson Kline MD at 04/13/21 0718          Operative/Procedure Notes (all)      Edenilson Kline MD at 04/13/21 0749  Version 1 of 1         DATE OF PROCEDURE: 04/13/21    PREOPERATIVE DIAGNOSIS: left knee arthritis      POSTOPERATIVE DIAGNOSIS:  left knee arthritis    PROCEDURES PERFORMED:   left total knee arthroplasty with [employer] Legion components (#7 cruciate retaining femur, #6 tibia, 10 mm polyethylene, with 35 three peg patella).     SURGEON: Edenilson Kline MD    ASSISTANT: Twila Walker PA-C  (Twila Walker PA-C was present and necessary for positioning, draping, retraction, instrumentation and closure.)    SPECIMENS: None    IMPLANTS:   Implants     Implant    Cmt Bone Simplex/P Full Dose 10/Pk - [id number] - Implanted   (Left) Knee    Inventory item: CMT BONE SIMPLEX/P FULL DOSE 10/PK Model/Cat number: [id number]    : [email or web address] Lot number: [id number]    As of 4/13/2021     Status: Implanted                  Cmt Bone Simplex/P Full Dose 10/Pk - [id number] - Implanted   (Left) Knee    Inventory item: CMT BONE SIMPLEX/P  FULL DOSE 10/PK Model/Cat number: [id number]    : [employer] Lot number: [id number]    As of 2021     Status: Implanted                  Sut Contrl Tiss Stratafix Spiral Mncryl Ud 3/0 Pls 60cm - [id number] - Implanted   (Left) Knee    Inventory item: SUT CONTRL TISS STRATAFIX SPIRAL MNCRYL UD 3/0 PLS 60CM Model/Cat number: [id number]    : [employer] ENDO SURGERY  DIV OF J AND J Lot number: [id number]    As of 2021     Status: Implanted                  Sut Contrl Tiss Stratafix Symm Pds Plus Yadira Ct-1 45cm - [id number] - Implanted   (Left) Knee    Inventory item: SUT CONTRL TISS STRATAFIX SYMM PDS PLUS YADIRA CT-1 45CM Model/Cat number: [id number]    : [employer]  DIV OF J AND J Lot number: [id number]    As of 2021     Status: Implanted                  Pat Gen2 Resrf 35mm - [id number] - Implanted   (Left) Knee    Inventory item: PAT GEN2 RESRF 35MM Model/Cat number: [id number]    : [employer] Lot number: [id number]    As of 2021     Status: Implanted                  Base Tib/Kn Gen2 Nonpor Ti Sz6 Lt - [id number] - Implanted   (Left) Knee    Inventory item: BASE TIB/KN GEN2 NONPOR TI SZ6 LT Model/Cat number: [id number]    : [employer] Lot number: [id number]    As of 2021     Status: Implanted                  Comp Fem Legion Oxinium Cr Sz7 Lt - [id number] - Implanted   (Left) Knee    Inventory item: COMP FEM LEGION OXINIUM CR SZ7 LT Model/Cat number: [id number]    : [employer] Lot number: [id number]    As of 2021     Status: Implanted                  Insrt Art Legion Cr Hf Xlpe Sz5to6 10mm - [id number] - Implanted   (Left) Knee    Inventory item: INSRT ART LEGION CR HF XLPE SZ5TO6 10MM Model/Cat number: [id number]    : [employer] Lot number: [id number]    As of 2021     Status: Implanted                         ANESTHESIA:  Spinal    STAFF:  Circulator: Anabell Hobson RN  Scrub Person: Lorna Dubose  Representative: Cristino Bruno  Nursing Assistant: Danilo Yang PCT; Shayy Lindsey  Assistant: Twila Walker PA-C    TOURNIQUET TIME: 16 minutes    ESTIMATED BLOOD LOSS: 150 mL     COMPLICATIONS: None    PREOPERATIVE ANTIBIOTICS: Clindamycin and vancomycin    INDICATIONS: The patient is a 66 y.o. male with debilitating left knee pain secondary to osteoarthritis that failed to improve in spite of conservative treatment .  Options have been discussed at length with the patient and the patient has had an extended course of conservative treatment without long-term benefit. The patient has reached the point where the patient desires total knee arthroplasty surgery and understands the risks, benefits, and alternatives. Consent was obtained. Please see my office notes for details with regard to preoperative counseling and operative rationale.     DESCRIPTION OF PROCEDURE: The patient was positively identified in the preoperative holding area and brought to the operating suite and placed in a supine position. After adequate spinal anesthetic had been achieved, the left lower extremity was prepped and draped in the usual sterile fashion.  After application of a tourniquet to the left upper thigh, which was used during the procedure for a total 16 minutes during the cementation process only. Landmarks of the knee were identified and timeout procedure was performed to confirm the operative site, as well as other parameters. Following the sterile prep and drape, a skin incision was made just off the medial aspect of midline for a medial parapatellar approach. Following a sharp skin incision, dissection was carried down to the level of the extensor mechanism and a medial parapatellar arthrotomy was made and the patella was tucked into the lateral gutter. Description of arthritis: Bone-on-bone contact medial compartment, tricompartmental osteophytes, varus alignment.  The knee was adequately exposed and a distal  femoral cut was made with an intramedullary guide. This was subsequently sized for a #7 implant and anterior, posterior chamfer cuts made. The menisci removed both medially and laterally.  The ACL was transected and the tibia was subluxed anteriorly. Proximal tibia cut was made with the external alignment guide. The cut was noted to be perpendicular to the tibial axis, with symmetric flexion and extension gaps. Therefore, final tibial preparations to accommodate a #6 tibia were made, followed by final femoral preparations. With a trial 10 insert in place, full flexion and extension was noted with no instability. The patella was then prepared for a 35 three peg patella which had excellent tracking. All trial components were removed and final components were cemented in place with namely a #6 tibia, #7 cruciate retaining femur and a 35 three peg patella with a trial 11 insert for cement compression. All the excess cement was removed from the bone implant interface and allowed to harden. Tourniquet was deflated. Hemostasis was obtained with electrocautery. There was no brisk bleeding noted in the popliteal fossa in particular. Therefore, the knee was copiously irrigated as it was between major steps, and the final 10 insert was placed as this was deemed appropriate for the patient's anatomy with full flexion and extension and no instability and attention was then directed towards closure. The medial parapatellar arthrotomy was closed with #1 Vicryl in an interrupted figure-of-eight fashion in 4 strategic locations followed by oversewing this from proximal to distal with a #1 StrataFix symmetric, which nicely sealed the joint, followed by closure of the deep fascial layer with #1 Vicryl in a buried interrupted fashion, followed by closure of the subcutaneous layer with 2-0 Vicryl and the skin with 3-0 Stratafix in a running subcuticular fashion.  Adhesive wound closure dressing was applied followed by a sterile dressing  with 4 x 4's, abdominal pad, soft roll and Ace wrap. The patient tolerated the procedure well and was brought to the recovery room in good condition.     PLAN:  1.  The patient will begin early range of motion and weight-bearing per the post total knee arthroplasty protocol.   2.  I anticipate brief hospitalization for initial rehabilitation and pain control followed by continued rehabilitation home health or outpatient physical therapy setting.  Patient may be a candidate for same-day discharge if his pain is controlled, cleared by therapy, and stable medically.  Follow-up with me in 3 weeks as planned.   3.  Postoperative medical management with Dr. Cherry.  4.  Aspirin will be utilized for DVT prophylaxis.       Phan Kline MD  21  09:05 EDT    Electronically signed by Phan Kline MD at 21 0908    [address] [address]  Phone:  [phone]  Fax:  [phone] Date: 2021      Ambulatory Referral to Home Health     Patient:  Artur Saabbs MRN:  [id number]   [address] [place] [id number] :  1955  SSN:    Phone: [phone] Sex:  M      INSURANCE PAYOR PLAN GROUP # SUBSCRIBER ID   Primary:    HUMANA MEDICARE REPLACEMENT [id number]      Referring Provider Information:  PHAN KLINE Phone: [phone] Fax: [phone]      Referral Information:   # Visits:  1 Referral Type: Home Health [42]   Urgency:  Routine Referral Reason: Specialty Services Required   Start Date: 2021 End Date:  To be determined by Insurer   Diagnosis: Primary osteoarthritis of left knee (M17.12 [ICD-10-CM] 715.16 [ICD-9-CM])  S/P total knee arthroplasty, left (Z96.652 [ICD-10-CM] V43.65 [ICD-9-CM])  Obesity (BMI 30-39.9) (E66.9 [ICD-10-CM] 278.00 [ICD-9-CM])  Impaired functional mobility, balance, gait, and endurance (Z74.09 [ICD-10-CM] V49.89 [ICD-9-CM])      Refer to Dept:   Refer to Provider:    Refer to Facility:       Face to Face Visit Date: 4/13/2021  Follow-up provider for Plan of Care? I will be treating the patient on an ongoing basis.  Please send me the Plan of Care for signature.  Follow-up provider: PHAN KLINE [[id number]]  Reason/Clinical Findings: s/p total left knee replacement  Describe mobility limitations that make leaving home difficult: s/p TKR, impaired functional gait and mobility requiring use of a walker  Nursing/Therapeutic Services Requested: Physical Therapy  PT orders: Total joint pathway  Frequency: 1 Week 1     This document serves as a request of services and does not constitute Insurance authorization or approval of services.  To determine eligibility, please contact the members Insurance carrier to verify and review coverage.     If you have medical questions regarding this request for services. Please contact [address] at [phone] during normal business hours.       Authorizing Provider:Phan Kline MD  Authorizing Provider's NPI: [id number]  Order Entered By: Kellerman, Sonja C, RN 4/13/2021  3:26 PM     Electronically signed by: Phan Kline MD 4/13/2021  3:26 PM    : [place], [phone]

## 2021-04-13 NOTE — H&P
Patient Name: Artur Boone Maral  MRN: [id number]  : 1955  DOS: 2021    Attending: Edenilson Kline MD    Primary Care Provider: Derrick Acosta MD      Chief complaint: Left knee Pain.    Subjective   Patient is a pleasant 66 y.o. male presented for scheduled surgery by Dr. Kline.    Per his note (The patient is a 66 y.o. male with debilitating left knee pain secondary to osteoarthritis that failed to improve in spite of conservative treatment .  Options have been discussed at length with the patient and the patient has had an extended course of conservative treatment without long-term benefit. The patient has reached the point where the patient desires total knee arthroplasty surgery and understands the risks, benefits, and alternatives. Consent was obtained. Please see my office notes for details with regard to preoperative counseling and operative rationale.).    Patient has history of rheumatoid arthritis, is on Enbrel, is followed by rheumatology clinic at .  Enbrel has been on hold for 2 weeks with planned with holding for 2 more weeks following surgery.    Patient underwent left total knee arthroplasty under spinal anesthesia, tolerated surgery well.  Adductor canal nerve block catheter was placed by acute pain service.    Seen in his room postoperatively, he is doing fairly well.  Good pain control.  No nausea, vomiting, or shortness of breath.  He has no history of DVT or PE.       Allergies   Allergen Reactions   • Keflex [Cephalexin] Hives and Rash       Meds:  Medications Prior to Admission   Medication Sig Dispense Refill Last Dose   • atorvastatin (LIPITOR) 80 MG tablet Take 80 mg by mouth Every Night.   2021 at 0400   • bisoprolol (ZEBeta) 10 MG tablet Take 10 mg by mouth Daily.   2021 at 0400   • Chlorhexidine Gluconate 4 % solution Apply  topically to the appropriate area as directed Daily. 237 mL 0 2021 at 0400   • Etanercept (ENBREL SC) Inject  under the skin into the  "appropriate area as directed 1 (One) Time Per Week.   Past Month at Unknown time   • gabapentin (NEURONTIN) 100 MG capsule Take 100 mg by mouth 3 (Three) Times a Day As Needed.   2021 at 0400   • HYDROcodone-acetaminophen (NORCO) 7.5-325 MG per tablet Take 1 tablet by mouth Every 6 (Six) Hours As Needed.   2021 at 2000   • levothyroxine (SYNTHROID, LEVOTHROID) 75 MCG tablet Take 75 mcg by mouth Daily.   2021 at 0400   • losartan (COZAAR) 100 MG tablet Take 100 mg by mouth Daily.   2021 at 2000   • mupirocin (BACTROBAN) 2 % ointment place into the nostril(s) as directed by provider 2 (Two) Times a Day. 22 g 0 2021 at 2030       Past Medical History:   Diagnosis Date   • Disease of thyroid gland    • Hyperlipidemia    • Hypertension    • Rheumatoid arthritis (CMS/HCC)    • Wears reading eyeglasses      Past Surgical History:   Procedure Laterality Date   • COLONOSCOPY     • KNEE SURGERY Right    • OTHER SURGICAL HISTORY      fistula urgery with Leann  approx     Family History   Problem Relation Age of Onset   • No Known Problems Mother    • Heart attack Father      Social History     Tobacco Use   • Smoking status: Former Smoker     Packs/day: 2.00     Years: 30.00     Pack years: 60.00     Types: Cigarettes     Quit date:      Years since quittin.2   • Smokeless tobacco: Never Used   Substance Use Topics   • Alcohol use: Yes     Alcohol/week: 4.0 - 5.0 standard drinks     Types: 4 - 5 Cans of beer per week   • Drug use: No   , retired.    Review of Systems  Pertinent items are noted in HPI, all other systems reviewed and negative    Vital Signs  /70 (BP Location: Right arm, Patient Position: Lying)   Pulse 61   Temp 97.6 °F (36.4 °C) (Axillary)   Resp 16   Ht 177.8 cm (70\")   Wt 121 kg (267 lb)   SpO2 96%   BMI 38.31 kg/m²     Physical Exam:    General Appearance:    Alert, cooperative, in no acute distress   Head:    Normocephalic, without obvious " abnormality, atraumatic   Eyes:            Lids and lashes normal, conjunctivae and sclerae normal, no   icterus, no pallor, corneas clear    Ears:    Ears appear intact with no abnormalities noted   Throat:   No oral lesions, no thrush, oral mucosa moist   Neck:   No adenopathy, supple, trachea midline, no thyromegaly         Lungs:     Clear to auscultation,respirations regular, even and                   unlabored    Heart:    Regular rhythm and normal rate, normal S1 and S2, no       murmur, no gallop   Abdomen:     Normal bowel sounds, no masses, no organomegaly, soft        non-tender, non-distended, no guarding, no rebound                 tenderness   Genitalia:    Deferred   Extremities:  Left LE, CDI dressing on knee, PNB cath present.    Pulses:   Pulses palpable and equal bilaterally   Skin:   No bleeding, bruising or rash   Neurologic:   Cranial nerves 2 - 12 grossly intact, intact flexion dorsiflexion bilateral feet      I reviewed the patient's new clinical results.             Invalid input(s): NEUTOPHILPCT,  EOSPCT        Invalid input(s): LABALBU, PROT  Lab Results   Component Value Date    HGBA1C 5.30 04/01/2021     Results for NICOLASA FAROOQ [facility] (MRN [id number]) as of 4/13/2021 12:18   Ref. Range 4/1/2021 13:26   Glucose Latest Ref Range: 65 - 99 mg/dL 103 (H)   Sodium Latest Ref Range: 136 - 145 mmol/L 137   Potassium Latest Ref Range: 3.5 - 5.2 mmol/L 4.1   CO2 Latest Ref Range: 22.0 - 29.0 mmol/L 25.0   Chloride Latest Ref Range: 98 - 107 mmol/L 100   Anion Gap Latest Ref Range: 5.0 - 15.0 mmol/L 12.0   Creatinine Latest Ref Range: 0.76 - 1.27 mg/dL 1.24   BUN Latest Ref Range: 8 - 23 mg/dL 17   BUN/Creatinine Ratio Latest Ref Range: 7.0 - 25.0  13.7   Calcium Latest Ref Range: 8.6 - 10.5 mg/dL 9.5   eGFR Non African Am Latest Ref Range: >60 mL/min/1.73 59 (L)   Hemoglobin A1C Latest Ref Range: 4.80 - 5.60 % 5.30   C-Reactive Protein Latest Ref Range: 0.00 - 0.50 mg/dL 0.54 (H)   Protime Latest  Ref Range: 11.5 - 14.0 Seconds 13.6   INR Latest Ref Range: 0.85 - 1.16  1.07   PTT Latest Ref Range: 24.0 - 37.0 seconds 32.4   WBC Latest Ref Range: 3.40 - 10.80 10*3/mm3 4.86   RBC Latest Ref Range: 4.14 - 5.80 10*6/mm3 4.55   Hemoglobin Latest Ref Range: 13.0 - 17.7 g/dL 14.0   Hematocrit Latest Ref Range: 37.5 - 51.0 % 41.7   RDW Latest Ref Range: 12.3 - 15.4 % 12.9   MCV Latest Ref Range: 79.0 - 97.0 fL 91.6   MCH Latest Ref Range: 26.6 - 33.0 pg 30.8   MCHC Latest Ref Range: 31.5 - 35.7 g/dL 33.6   MPV Latest Ref Range: 6.0 - 12.0 fL 10.1   Platelets Latest Ref Range: 140 - 450 10*3/mm3 158         Assessment and Plan:       S/P total knee arthroplasty, left    Primary osteoarthritis of left knee    HTN (hypertension)    Hyperlipidemia    Obesity (BMI 30-39.9)      Plan  1. PT/OT,  Weight bearing as tolerated left LE  2. Pain control-prns, ACB cath with ropivacaine infusion.  3. IS-encourage  4. DVT proph- Mechanicals and aspirin  5. Bowel regimen  6. Resume home medications as appropriate  7. Monitor post-op labs  8. DC planning for home    Patient is very motivated to achieve his goals with PT, achieve good pain control, and be discharged home later today.    I reviewed with him regarding postop management goals, medications at time of discharge if he is able to be discharged home today including goals for DVT prophylaxis, pain control, and bowel regimen..    Patient and his wife expressed understanding and agreement.    Dragon disclaimer:  Part of this encounter note is an electronic transcription/translation of spoken language to printed text. The electronic translation of spoken language may permit erroneous, or at times, nonsensical words or phrases to be inadvertently transcribed; Although I have reviewed the note for such errors, some may still exist.    Dedrick Cherry MD  04/13/21  12:18 EDT

## 2021-04-13 NOTE — PROGRESS NOTES
Discharge Planning Assessment  [facility]     Patient Name: Artur Pearson  MRN: [id number]  Today's Date: 4/13/2021    Admit Date: 4/13/2021    Discharge Needs Assessment     Row Name 04/13/21 1531       Living Environment    Lives With  spouse    Name(s) of Who Lives With Patient  Ana Paula ( spouse)    Current Living Arrangements  home/apartment/condo    Primary Care Provided by  self    Provides Primary Care For  no one    Family Caregiver if Needed  child(mel), adult;spouse    Family Caregiver Names  Derik ( wife) and son    Quality of Family Relationships  helpful;involved;supportive    Able to Return to Prior Arrangements  yes       Resource/Environmental Concerns    Resource/Environmental Concerns  none    Transportation Concerns  car, none       Transition Planning    Patient/Family Anticipates Transition to  home with family    Patient/Family Anticipated Services at Transition  home health care    Transportation Anticipated  family or friend will provide       Discharge Needs Assessment    Readmission Within the Last 30 Days  no previous admission in last 30 days    Current Outpatient/Agency/Support Group  homecare agency    Equipment Currently Used at Home  walker, rolling;grab bar also has installed high toilet with grab bars    Concerns to be Addressed  basic needs;discharge planning    Equipment Needed After Discharge  none    Outpatient/Agency/Support Group Needs  homecare agency    Discharge Facility/Level of Care Needs  home with home health    Provided Post Acute Provider List?  Yes    Post Acute Provider List  Home Health    Delivered To  Patient    Method of Delivery  In person    Patient's Choice of Community Agency(s)  [facility]    Current Discharge Risk  physical impairment        Discharge Plan     Row Name 04/13/21 0083       Plan    Plan  Home with     Patient/Family in Agreement with Plan  yes    Plan Comments  I met with Mr Lizama and wife at bedside to discuss d/c plan. His plan is to  return home. Wife will be available to assist as needed,she has taken time off work until Friday, he will have help from son and others while wife at work. He already has a rolling walker at home and higher toilet installed. We discussed options for PT services. Home Health list/options provided. [facility]. I spoke with Joseph at [phone],ext [phone] who accepted referral for home PT, referral faxed to [phone],they will plan to see him at his home on 4/14/21. No other d/c needs identified.    Final Discharge Disposition Code  06 - home with home health care        Continued Care and Services - Admitted Since 4/13/2021     Home Medical Care     Service Provider Request Status Selected Services Address Phone Fax Patient Preferred    [facility] [place]  Pending - No Request Sent N/A [address] [place] [id number] [phone] --                Demographic Summary     Row Name 04/13/21 1530       General Information    Admission Type  same day    Arrived From  home    Referral Source  physician    Reason for Consult  discharge planning    Preferred Language  English    General Information Comments  PCP - Derrick Acosta       Contact Information    Permission Granted to Share Info With  ;family/designee        Functional Status     Row Name 04/13/21 1530       Functional Status    Usual Activity Tolerance  good    Current Activity Tolerance  -- see PT evaluation       Functional Status, IADL    Medications  independent    Meal Preparation  independent    Housekeeping  independent    Laundry  independent    Shopping  independent    IADL Comments  Independent prior to admit       Mental Status    General Appearance WDL  WDL       Mental Status Summary    Recent Changes in Mental Status/Cognitive Functioning  no changes       Employment/    Employment Status  retired    Employment/ Comments  insurance- [employer] Medicare replacement        Psychosocial    No  documentation.       Abuse/Neglect    No documentation.       Legal    No documentation.       Substance Abuse    No documentation.       Patient Forms    No documentation.           Sonja C Kellerman, RN

## 2021-04-13 NOTE — THERAPY EVALUATION
Patient Name: Artur Pearson  : 1955    MRN: [id number]                              Today's Date: 2021       Admit Date: 2021    Visit Dx:     ICD-10-CM ICD-9-CM   1. S/P total knee arthroplasty, left  Z96.652 V43.65   2. Primary osteoarthritis of left knee  M17.12 715.16     Patient Active Problem List   Diagnosis   • Primary osteoarthritis of both knees   • Primary osteoarthritis of left knee   • HTN (hypertension)   • Hyperlipidemia   • Obesity (BMI 30-39.9)   • S/P total knee arthroplasty, left     Past Medical History:   Diagnosis Date   • Disease of thyroid gland    • Hyperlipidemia    • Hypertension    • Rheumatoid arthritis (CMS/HCC)    • Wears reading eyeglasses      Past Surgical History:   Procedure Laterality Date   • COLONOSCOPY     • KNEE SURGERY Right    • OTHER SURGICAL HISTORY      fistula urgery with Leann 1985 approx     General Information     Row Name 21 131          Physical Therapy Time and Intention    Document Type  evaluation  -BARRY     Mode of Treatment  physical therapy;individual therapy  -BARRY     Row Name 21 131          General Information    Patient Profile Reviewed  yes  -BARRY     Prior Level of Function  min assist:;all household mobility;transfer;bed mobility;ADL's  -BARRY     Existing Precautions/Restrictions  fall;other (see comments) L adductor nerve block  -BARRY     Barriers to Rehab  none identified  -BARRY     Row Name 21 131          Living Environment    Lives With  spouse  -BARRY     Row Name 21 131          Home Main Entrance    Number of Stairs, Main Entrance  none  -BARRY     Row Name 21 1310          Stairs Within Home, Primary    Stairs, Within Home, Primary  0  -BARRY     Number of Stairs, Within Home, Primary  none  -BARRY     Row Name 21 1310          Cognition    Orientation Status (Cognition)  oriented x 4  -BARRY     Row Name 21 131          Safety Issues, Functional Mobility    Safety Issues Affecting Function  (Mobility)  safety precaution awareness;safety precautions follow-through/compliance  -     Impairments Affecting Function (Mobility)  strength;endurance/activity tolerance;pain;range of motion (ROM)  -       User Key  (r) = Recorded By, (t) = Taken By, (c) = Cosigned By    Initials Name Provider Type    BARRY Frandy Lopes, PT Physical Therapist        Mobility     Row Name 04/13/21 1310          Bed Mobility    Bed Mobility  scooting/bridging;supine-sit  -BARRY     Scooting/Bridging [place] (Bed Mobility)  supervision;verbal cues  -BARRY     Supine-Sit [place] (Bed Mobility)  supervision;verbal cues  -BARRY     Assistive Device (Bed Mobility)  bed rails;head of bed elevated  -     Comment (Bed Mobility)  Verbal cues for LE sequencing off of EOB and trunk control into sitting  -     Row Name 04/13/21 1310          Transfers    Comment (Transfers)  Verbal cues for safe hand placement during standing/sitting and moving L LE out for comfort prior to sitting  -     Row Name 04/13/21 1310          Sit-Stand Transfer    Sit-Stand [place] (Transfers)  verbal cues;contact guard  -     Assistive Device (Sit-Stand Transfers)  walker, front-wheeled  -     Row Name 04/13/21 1310          Gait/Stairs (Locomotion)    [place] Level (Gait)  verbal cues;contact guard;1 person to manage equipment  -     Assistive Device (Gait)  walker, front-wheeled  -     Distance in Feet (Gait)  360 feet  -     Deviations/Abnormal Patterns (Gait)  bilateral deviations;[place] decreased;gait speed decreased;stride length decreased  -     Bilateral Gait Deviations  forward flexed posture  -     Left Sided Gait Deviations  heel strike decreased;weight shift ability decreased  -     [place] Level (Stairs)  not tested  -     Comment (Gait/Stairs)  Pt ambulated with step through pattern and decreased speed. Verbal cues for maintaining upright posture, body within walker, increase step length, and WB through LEs.  Gait limited by fatigue. No knee buckling noted.  -[facility] Name 04/13/21 1310          Mobility    Extremity Weight-bearing Status  left lower extremity  -     Left Lower Extremity (Weight-bearing Status)  weight-bearing as tolerated (WBAT)  -       User Key  (r) = Recorded By, (t) = Taken By, (c) = Cosigned By    Initials Name Provider Type    Frandy Gee PT Physical Therapist        Obj/Interventions     Row Name 04/13/21 1310          Range of Motion Comprehensive    General Range of Motion  lower extremity range of motion deficits identified  -     Comment, General Range of Motion  R LE AROM WFL; L LE AROM impaired 25%; able to actively DF/PF  -BARRY     Row Name 04/13/21 1310          Strength Comprehensive (MMT)    General Manual Muscle Testing (MMT) Assessment  lower extremity strength deficits identified  -     Comment, General Manual Muscle Testing (MMT) Assessment  R LE functionally 4+/5; L LE functionally 4-/5; IND with SLR  -BARRY     Row Name 04/13/21 1310          Motor Skills    Therapeutic Exercise  hip;knee;ankle  -BARRY     Row Name 04/13/21 1310          Hip (Therapeutic Exercise)    Hip (Therapeutic Exercise)  isometric exercises  -     Hip Isometrics (Therapeutic Exercise)  gluteal sets;10 repetitions  -BARRY     Row Name 04/13/21 1310          Knee (Therapeutic Exercise)    Knee (Therapeutic Exercise)  isometric exercises  -     Knee Isometrics (Therapeutic Exercise)  quad sets;10 repetitions  -BARRY     Row Name 04/13/21 1310          Ankle (Therapeutic Exercise)    Ankle (Therapeutic Exercise)  AROM (active range of motion)  -     Ankle AROM (Therapeutic Exercise)  bilateral;dorsiflexion;plantarflexion;10 repetitions  -BARRY     Row Name 04/13/21 1310          Sensory Assessment (Somatosensory)    Sensory Assessment (Somatosensory)  LE sensation intact  -       User Key  (r) = Recorded By, (t) = Taken By, (c) = Cosigned By    Initials Name Provider Type    Frandy Gee PT Physical  Therapist        Goals/Plan     Row Name 04/13/21 1310          Bed Mobility Goal 1 (PT)    Activity/Assistive Device (Bed Mobility Goal 1, PT)  sit to supine/supine to sit  -BARRY     [place] Level/Cues Needed (Bed Mobility Goal 1, PT)  modified independence  -BARRY     Time Frame (Bed Mobility Goal 1, PT)  long term goal (LTG);3 days  -BARRY     [facility] Name 04/13/21 1310          Transfer Goal 1 (PT)    Activity/Assistive Device (Transfer Goal 1, PT)  sit-to-stand/stand-to-sit;walker, rolling  -BARRY     [place] Level/Cues Needed (Transfer Goal 1, PT)  modified independence  -BARRY     Time Frame (Transfer Goal 1, PT)  long term goal (LTG);3 days  -BARRY     [facility] Name 04/13/21 1310          Gait Training Goal 1 (PT)    Activity/Assistive Device (Gait Training Goal 1, PT)  gait (walking locomotion);walker, rolling  -BARRY     [place] Level (Gait Training Goal 1, PT)  modified independence  -BARRY     Distance (Gait Training Goal 1, PT)  500 feet  -BARRY     Time Frame (Gait Training Goal 1, PT)  long term goal (LTG);3 days  -BARRY     Row Name 04/13/21 1310          ROM Goal 1 (PT)    ROM Goal 1 (PT)  L knee AROM 0-90 degrees  -BARRY     Time Frame (ROM Goal 1, PT)  long-term goal (LTG);3 days  -BARRY       User Key  (r) = Recorded By, (t) = Taken By, (c) = Cosigned By    Initials Name Provider Type    Frandy Gee, PT Physical Therapist        Clinical Impression     Row Name 04/13/21 1310          Pain    Additional Documentation  Pain Scale: Numbers Pre/Post-Treatment (Group)  -BARRY     Row Name 04/13/21 1310          Pain Scale: Numbers Pre/Post-Treatment    Pretreatment Pain Rating  1/10  -BARRY     Posttreatment Pain Rating  3/10  -BARRY     Pain Location - Side  Left  -BARRY     Pain Location - Orientation  anterior  -BARRY     Pain Location  knee  -BARRY     Pain Intervention(s)  Repositioned;Cold applied;Ambulation/increased activity  -BARRY     Row Name 04/13/21 1310          Therapy Assessment/Plan (PT)    Patient/Family Therapy Goals Statement  (PT)  To return home  -BARRY     Criteria for Skilled Interventions Met (PT)  yes;meets criteria;skilled treatment is necessary  -BARRY     Row Name 04/13/21 1310          Positioning and Restraints    Pre-Treatment Position  in bed  -BARRY     Post Treatment Position  chair  -BARRY     In Chair  notified nsg;reclined;call light within reach;exit alarm on;encouraged to call for assist;with family/caregiver;legs elevated;compression device  -BARRY       User Key  (r) = Recorded By, (t) = Taken By, (c) = Cosigned By    Initials Name Provider Type    Frandy Gee PT Physical Therapist        Outcome Measures     Row Name 04/13/21 1310          How much help from another person do you currently need...    Turning from your back to your side while in flat bed without using bedrails?  4  -BARRY     Moving from lying on back to sitting on the side of a flat bed without bedrails?  4  -BARRY     Moving to and from a bed to a chair (including a wheelchair)?  3  -BARRY     Standing up from a chair using your arms (e.g., wheelchair, bedside chair)?  3  -BARRY     Climbing 3-5 steps with a railing?  3  -BARRY     To walk in hospital room?  3  -BARRY     AM-PAC 6 Clicks Score (PT)  20  -BARRY     Row Name 04/13/21 1310          PADD    Diagnosis  1  -BARRY     Gender  2  -BARRY     Age Group  2  -BARRY     Gait Distance  1  -ABRRY     Assist Level  1  -BARRY     Home Support  3  -BARRY     PADD Score  10  -BARRY     Patient Preference  home with home health  -BARRY     Prediction by PADD Score  directly home (with home health or out-patient rehab)  -     Row Name 04/13/21 1310          Functional Assessment    Outcome Measure Options  AM-PAC 6 Clicks Basic Mobility (PT);PADD  -BARRY       User Key  (r) = Recorded By, (t) = Taken By, (c) = Cosigned By    Initials Name Provider Type    Frandy Gee PT Physical Therapist        Physical Therapy Education                 Title: PT OT SLP Therapies (Done)     Topic: Physical Therapy (Done)     Point: Mobility training (Done)     Learning  Progress Summary           Patient Acceptance, E,D,H, VU by BARRY at 4/13/2021 1310    Comment: Educated on safe sequencing with bed mobility, ambulatory/car transfers, and gait training. Reviewed HEP and knee precautions via handout.   Significant Other Acceptance, E,D,H, VU by BARRY at 4/13/2021 1310    Comment: Educated on safe sequencing with bed mobility, ambulatory/car transfers, and gait training. Reviewed HEP and knee precautions via handout.                   Point: Home exercise program (Done)     Learning Progress Summary           Patient Acceptance, E,D,H, VU by BARRY at 4/13/2021 1310    Comment: Educated on safe sequencing with bed mobility, ambulatory/car transfers, and gait training. Reviewed HEP and knee precautions via handout.   Significant Other Acceptance, E,D,H, VU by BARRY at 4/13/2021 1310    Comment: Educated on safe sequencing with bed mobility, ambulatory/car transfers, and gait training. Reviewed HEP and knee precautions via handout.                   Point: Body mechanics (Done)     Learning Progress Summary           Patient Acceptance, E,D,H, VU by BARRY at 4/13/2021 1310    Comment: Educated on safe sequencing with bed mobility, ambulatory/car transfers, and gait training. Reviewed HEP and knee precautions via handout.   Significant Other Acceptance, E,D,H, VU by BARRY at 4/13/2021 1310    Comment: Educated on safe sequencing with bed mobility, ambulatory/car transfers, and gait training. Reviewed HEP and knee precautions via handout.                   Point: Precautions (Done)     Learning Progress Summary           Patient Acceptance, E,D,H, VU by BARRY at 4/13/2021 1310    Comment: Educated on safe sequencing with bed mobility, ambulatory/car transfers, and gait training. Reviewed HEP and knee precautions via handout.   Significant Other Acceptance, E,D,H, VU by BARRY at 4/13/2021 1310    Comment: Educated on safe sequencing with bed mobility, ambulatory/car transfers, and gait training. Reviewed HEP and  knee precautions via handout.                               User Key     Initials Effective Dates Name Provider Type Discipline     09/10/19 -  Frandy Lopes, PT Physical Therapist PT              PT Recommendation and Plan  Planned Therapy Interventions (PT): balance training, bed mobility training, gait training, home exercise program, patient/family education, transfer training, strengthening, ROM (range of motion)  Plan of Care Reviewed With: patient, spouse  Progress: improving  Outcome Summary: PT eval complete. Pt ambulated 360 feet using RW, CGA, and one person to manage equipment. Gait limited by fatigue. Bed mobility performed with supervision and STS with CGA. No knee buckling noted with ambulation. Pt IND with SLR. Will assess L knee AROM POD#1 if pt doesn't d/c today. Reviewed HEP and knee precautions via handout. Educated on safe car transfers. PADD score = 10. ADLs assessed, pt does not require OT eval prior to potential d/c today. Functionally, pt safe to d/c home with assist today from a PT perspective. Recommend HHPT.     Time Calculation:   PT Charges     Row Name 04/13/21 1310             Time Calculation    Start Time  1310  -BARRY      PT Received On  04/13/21  -      PT Goal Re-Cert Due Date  04/23/21  -         Time Calculation- PT    Total Timed Code Minutes- PT  10 minute(s)  -         Timed Charges    90007 - PT Therapeutic Exercise Minutes  2  -      63261 - Gait Training Minutes   8  -BARRY        User Key  (r) = Recorded By, (t) = Taken By, (c) = Cosigned By    Initials Name Provider Type    BARRY Frandy Lopes, PT Physical Therapist        Therapy Charges for Today     Code Description Service Date Service Provider Modifiers Qty    30140135939 HC GAIT TRAINING EA 15 MIN 4/13/2021 Frandy Lopes, PT GP 1    07510669976 HC PT EVAL LOW COMPLEXITY 3 4/13/2021 Frandy Lopes, PT GP 1    02973334763 HC PT THER SUPP EA 15 MIN 4/13/2021 Frandy Lopes, PT GP 3          PT G-Codes  Outcome Measure Options:  AM-PAC 6 Clicks Basic Mobility (PT), PADD  -PAC 6 Clicks Score (PT): 20    Frandy Lopes, PT  4/13/2021

## 2021-04-13 NOTE — ANESTHESIA PROCEDURE NOTES
Spinal Block      Patient reassessed immediately prior to procedure    Patient location during procedure: OR  Indication:at surgeon's request  Performed By  CRNA: Shreyas Hope CRNA  Preanesthetic Checklist  Completed: patient identified, IV checked, site marked, risks and benefits discussed, surgical consent, monitors and equipment checked, pre-op evaluation and timeout performed  Spinal Block Prep:  Patient Position:sitting  Sterile Tech:cap, gloves, sterile barriers and mask  Prep:Chloraprep  Patient Monitoring:blood pressure monitoring, continuous pulse oximetry and EKG  Spinal Block Procedure  Approach:midline  Guidance:landmark technique and palpation technique  Location:L4-L5  Needle Type:Sprotte  Needle Gauge:25 G  Placement of Spinal needle event:cerebrospinal fluid aspirated  Paresthesia: no  Fluid Appearance:clear  Medications: bupivacaine (MARCAINE) 0.5 % injection, 2 mL   Post Assessment  Patient Tolerance:patient tolerated the procedure well with no apparent complications  Complications no  Additional Notes  Procedure:  Pt assisted to sitting position, with legs in position of comfort over side of bed.  Pt. instructed in optimal spine presentation, the spine was prepped/ Draped and the skin at insertion site was anesthetized with 1% Lidocaine 2 ml.  The spinal needle was then advanced until CSF flow was obtained and LA was injected:

## 2021-04-13 NOTE — ANESTHESIA POSTPROCEDURE EVALUATION
Patient: Artur Pearson    Procedure Summary     Date: 04/13/21 Room / Location:  [facility]  [facility]    Anesthesia Start: 0724 Anesthesia Stop:     Procedure: LEFT TOTAL KNEE ARTHROPLASTY (Left Knee) Diagnosis:       Primary osteoarthritis of left knee      (Primary osteoarthritis of left knee [M17.12])    Surgeons: Edenilson Kline MD Provider: Derrick Jordan MD    Anesthesia Type: spinal ASA Status: 2          Anesthesia Type: spinal    Vitals  Vitals Value Taken Time   BP     Temp     Pulse     Resp     SpO2 99 % 04/13/21 0926   Vitals shown include unvalidated device data.        Post Anesthesia Care and Evaluation    Patient location during evaluation: PACU  Patient participation: complete - patient participated  Level of consciousness: awake and alert  Pain management: adequate  Airway patency: patent  Anesthetic complications: No anesthetic complications  PONV Status: none  Cardiovascular status: hemodynamically stable and acceptable  Respiratory status: nonlabored ventilation, acceptable and nasal cannula  Hydration status: acceptable

## 2021-04-13 NOTE — ANESTHESIA PREPROCEDURE EVALUATION
Anesthesia Evaluation     Patient summary reviewed and Nursing notes reviewed   no history of anesthetic complications:  NPO Solid Status: > 8 hours  NPO Liquid Status: > 8 hours           Airway   Mallampati: II  TM distance: >3 FB  Neck ROM: full  No difficulty expected  Dental      Pulmonary - negative pulmonary ROS and normal exam   Cardiovascular - normal exam    (+) hypertension, hyperlipidemia,       Neuro/Psych- negative ROS  GI/Hepatic/Renal/Endo - negative ROS     Musculoskeletal     Abdominal    Substance History - negative use     OB/GYN          Other   arthritis,                      Anesthesia Plan    ASA 2     spinal   (Block)  intravenous induction     Anesthetic plan, all risks, benefits, and alternatives have been provided, discussed and informed consent has been obtained with: patient.    Plan discussed with CRNA.

## 2021-04-13 NOTE — ANESTHESIA PROCEDURE NOTES
Left adductor canal      Patient reassessed immediately prior to procedure    Patient location during procedure: post-op  Reason for block: at surgeon's request and post-op pain management  Performed by  CRNA: Marek Delacruz, CRNA  Assisted by: Jennifer Negrete RN  Preanesthetic Checklist  Completed: patient identified, IV checked, site marked, risks and benefits discussed, surgical consent, monitors and equipment checked, pre-op evaluation and timeout performed  Prep:  Pt Position: supine  Sterile barriers:cap, gloves, mask and sterile barriers  Prep: ChloraPrep  Patient monitoring: blood pressure monitoring, continuous pulse oximetry and EKG  Procedure  Sedation:no  Performed under: spinal  Guidance:ultrasound guided  Images:still images obtained, printed/placed on chart    Laterality:left  Block Type:adductor canal block  Injection Technique:catheter  Needle Type:Tuohy and echogenic  Needle Gauge:18 G  Resistance on Injection: none  Catheter Size:20 G (20g)  Cath Depth at skin: 10 cm    Medications Used: bupivacaine PF (MARCAINE) 0.25 % injection, 30 mL      Post Assessment  Injection Assessment: negative aspiration for heme, incremental injection and no paresthesia on injection  Patient Tolerance:comfortable throughout block  Complications:no  Additional Notes  Procedure:             The pt was placed in the Supine position.  The Insertion site was  prepped and Draped in sterile fashion.  The pt was anesthetized with  IV Sedation( see meds).  Skin and cutaneous tissue was infiltrated and anesthetized with 1% Lidocaine 3 mls via a 25g needle.  A BBraun 4 inch 18g echogenic needle was then  inserted approximately midline, mid-thigh and advanced In-plane with Ultrasound guidance.  Normal Saline PSF was utilized for hydrodissection of tissue.  The Vastus medialis and Sartorius muscle where visualized and the needle tip was placed in the adductor canal,  lateral to the femoral artery.  LA injection spread was  visualized, injection was incremental 1-5ml, injection pressure was normal or little, no intraneural injection, no vascular injection.  LA dose was injected thru the needle(see dose above).  A BBraun 20g wire stylet catheter was placed via the needle with ultrasound visualization and confirmation with NS fluid bolus. The catheter insertion site was sealed with exofin tissue adhesive. The labeled catheter was then coiled and secured to skin with benzoin,  steristrips and CHG transparent dressing.  Appropriate labels were applied.  Thank you.

## 2021-04-14 NOTE — PROGRESS NOTES
[facility]    Nerve Cath Post Op Call    Patient Name: Artur Pearson  :  1955  MRN:  [id number]  Date of Discharge: 2021    Nerve Cath Post Op Call:    Catheter Plan:Patient called, No answer. Message left to call [facility] pain service for any questions or complaints  Patient/Family instructed to call ON CALL anesthesia provider for any questions or problems.  Patient Follow Up:

## 2021-04-15 NOTE — PROGRESS NOTES
[facility]    Nerve Cath Post Op Call    Patient Name: Artur Pearson  :  1955  MRN:  [id number]  Date of Discharge: 2021    Nerve Cath Post Op Call:    Catheter Plan:Patient/Family member report nerve catheter previously discontinued, tip intact  Patient/Family instructed to call ON CALL anesthesia provider for any questions or problems.  Patient Follow Up:

## 2021-04-19 ENCOUNTER — NURSE TRIAGE (OUTPATIENT)
Dept: CALL CENTER | Facility: HOSPITAL | Age: 66
End: 2021-04-19

## 2021-04-19 NOTE — TELEPHONE ENCOUNTER
Recent knee surgery, notice leg  And calf is swelling, no pain with walking, it is down at night, swells doing the day. No soa will be calling his surgery. Possible a normal occurannce    Reason for Disposition  • [1] Caller has NON-URGENT question AND [2] triager unable to answer question    Additional Information  • Negative: Sounds like a life-threatening emergency to the triager  • Negative: Chest pain  • Negative: Difficulty breathing  • Negative: Acting confused (e.g., disoriented, slurred speech) or excessively sleepy  • Negative: Surgical incision symptoms and questions  • Negative: [1] Discomfort (pain, burning or stinging) when passing urine AND [2] male  • Negative: [1] Discomfort (pain, burning or stinging) when passing urine AND [2] female  • Negative: Constipation  • Negative: New or worsening leg (calf, thigh) pain  • Negative: New or worsening leg swelling  • Negative: Dizziness is severe, or persists > 24 hours after surgery  • Negative: Pain, redness, swelling, or pus at IV Site  • Negative: Symptoms arising from use of a urinary catheter (Moffett or Coude)  • Negative: Cast problems or questions  • Negative: Medication question  • Negative: [1] Widespread rash AND [2] bright red, sunburn-like  • Negative: [1] SEVERE headache AND [2] after spinal (epidural) anesthesia  • Negative: [1] Vomiting AND [2] persists > 4 hours  • Negative: [1] Vomiting AND [2] abdomen looks much more swollen than usual  • Negative: [1] Drinking very little AND [2] dehydration suspected (e.g., no urine > 12 hours, very dry mouth, very lightheaded)  • Negative: Patient sounds very sick or weak to the triager  • Negative: Sounds like a serious complication to the triager  • Negative: Fever > 100.4 F (38.0 C)  • Negative: [1] SEVERE post-op pain (e.g., excruciating, pain scale 8-10) AND [2] not controlled with pain medications  • Negative: [1] Caller has URGENT question AND [2] triager unable to answer question  • Negative: [1]  "Headache AND [2] after spinal (epidural) anesthesia AND [3] not severe  • Negative: Fever present > 3 days (72 hours)  • Negative: [1] MILD-MODERATE post-op pain (e.g., pain scale 1-7) AND [2] not controlled with pain medications    Answer Assessment - Initial Assessment Questions  1. SYMPTOM: \"What's the main symptom you're concerned about?\" (e.g., pain, fever, vomiting)  Swelling with pain on left  2. ONSET: \"When did *No Answer*  start?\"  Second  Day home  3. SURGERY: \"What surgery was performed?\"  Knee surgery  4. DATE of SURGERY: \"When was surgery performed?\"       04/13  5. ANESTHESIA: \" What type of anesthesia did you have?\" (e.g., general, spinal, epidural, local)   general  6. PAIN: \"Is there any pain?\" If so, ask: \"How bad is it?\"  (Scale 1-10; or mild, moderate, severe)     4/5  7. FEVER: \"Do you have a fever?\" If so, ask: \"What is your temperature, how was it measured, and when did it start?\"   no  8. VOMITING: \"Is there any vomiting?\" If yes, ask: \"How many times?\"  no  9. BLEEDING: \"Is there any bleeding?\" If so, ask: \"How much?\" and \"Where?\"    no  10. OTHER SYMPTOMS: \"Do you have any other symptoms?\" (e.g., drainage from wound, painful urination, constipation)      Swelling of calf and legs which goes down at night    Protocols used: POST-OP SYMPTOMS AND QUESTIONS-ADULT-      "

## 2021-04-21 ENCOUNTER — TELEPHONE (OUTPATIENT)
Dept: ORTHOPEDIC SURGERY | Facility: CLINIC | Age: 66
End: 2021-04-21

## 2021-04-21 ENCOUNTER — HOSPITAL ENCOUNTER (EMERGENCY)
Age: 66
Discharge: HOME | End: 2021-04-21
Payer: MEDICARE

## 2021-04-21 VITALS
RESPIRATION RATE: 18 BRPM | HEART RATE: 68 BPM | SYSTOLIC BLOOD PRESSURE: 145 MMHG | DIASTOLIC BLOOD PRESSURE: 71 MMHG | TEMPERATURE: 97.88 F | OXYGEN SATURATION: 98 %

## 2021-04-21 VITALS
RESPIRATION RATE: 18 BRPM | HEART RATE: 80 BPM | SYSTOLIC BLOOD PRESSURE: 159 MMHG | OXYGEN SATURATION: 99 % | TEMPERATURE: 98.24 F | DIASTOLIC BLOOD PRESSURE: 76 MMHG

## 2021-04-21 VITALS — BODY MASS INDEX: 37.3 KG/M2

## 2021-04-21 DIAGNOSIS — Z88.1: ICD-10-CM

## 2021-04-21 DIAGNOSIS — Z96.652: ICD-10-CM

## 2021-04-21 DIAGNOSIS — I10: ICD-10-CM

## 2021-04-21 DIAGNOSIS — R60.0: Primary | ICD-10-CM

## 2021-04-21 DIAGNOSIS — M79.89 LEFT LEG SWELLING: Primary | ICD-10-CM

## 2021-04-21 DIAGNOSIS — Z79.899: ICD-10-CM

## 2021-04-21 DIAGNOSIS — E78.5: ICD-10-CM

## 2021-04-21 PROCEDURE — 99281 EMR DPT VST MAYX REQ PHY/QHP: CPT

## 2021-04-21 NOTE — TELEPHONE ENCOUNTER
"Dr. Kline-please see message below and advise. Thanks!    Called pt to discuss previous message; He reports within the last week he has had some intermittent calf and thigh swelling but it has improved; He is now concerned that since yesterday his foot and ankle have become swollen and icing and elevating doesn't seem to be improving his symptoms. He also reports a \"red streak\" on shin but reports it has been there since surgery and that Home Health has looked at it and doesn't seem to be concerned; He denies any fever, chills, night sweats; No drainage from incision or increased pain; I asked if he could send a picture of the red streak to us so I can show Dr. Kline but otherwise I will send this message to Dr. Kline and get back with him once I had a response.    Scotty                  "

## 2021-04-21 NOTE — TELEPHONE ENCOUNTER
PATIENT IS 1 WEEK POST OPERATIVE. PATIENT HAS CONCERNS ABOUT LEFT CALF, LEG, ANKLE, AND FOOT SWELLING SINCE LAST WEEK. PATIENT STATES ITS NOT PAINFUL. PLEASE ADVISE.

## 2021-04-21 NOTE — TELEPHONE ENCOUNTER
Called pt back to let him know [facility] wanted him to go get a duplex scan to rule out blood clot; Because of pt living in [place] he would like to go to the local ED ([facility]). I told him this would be fine but just to let us know the results. He understood. We will touch base after his scan.    Scotty

## 2021-04-22 ENCOUNTER — TELEPHONE (OUTPATIENT)
Dept: ORTHOPEDIC SURGERY | Facility: CLINIC | Age: 66
End: 2021-04-22

## 2021-04-22 ENCOUNTER — HOSPITAL ENCOUNTER (OUTPATIENT)
Age: 66
End: 2021-04-22
Payer: MEDICARE

## 2021-04-22 DIAGNOSIS — R60.0: Primary | ICD-10-CM

## 2021-04-22 DIAGNOSIS — M79.662: ICD-10-CM

## 2021-04-22 PROCEDURE — 93971 EXTREMITY STUDY: CPT

## 2021-04-22 NOTE — TELEPHONE ENCOUNTER
Provider: DR. MATAMOROS  Caller: NICOLASA FAROOQ  Relationship to Patient: SELF  Phone Number: [phone]  Reason for Call: PT WAS TOLD TO CALL BACK WITH ULTRASOUND RESULTS OF HIS LEG. WOULD LIKE A CALL BACK TO RELAY INFORMATION.

## 2021-04-22 NOTE — TELEPHONE ENCOUNTER
Spoke with pt; Had ultrasound last night at [facility] to rule out DVT and they stated it was negative but wanted him to come back in this morning for repeat scan once radiologist was in; They sent info to his PCP Dr. Acosta and gave him a script for an antibiotic (Clindamycin hcl 450mg 1 tab daily for 7 days); He reports significant improvement in his ankle/leg this morning and reports the redness has also improved.    I told him I would try to get official report from scan and send to Dr. Kline but if there was anything further to add, I would give him a call back and let him know. He understood.    Scotty

## 2021-04-22 NOTE — TELEPHONE ENCOUNTER
Called [facility] and they were not able to locate the report; I tried calling Dr. Acosta's office and M with the nurse (Taz) letting her know to call me back or if they have the report and could send it to our clinic fax.    Scotty

## 2021-04-23 NOTE — TELEPHONE ENCOUNTER
Dr. Kline-just an fyi. Received duplex scan result which was negative (scanned into chart,along with ED note). I called to check in on him this morning but had to leave a voicemail, but yesterday he reported much improvement. Let me know if any further action is needed.    Thanks!  Scotty

## 2021-04-28 DIAGNOSIS — M79.89 LEFT LEG SWELLING: ICD-10-CM

## 2021-05-05 ENCOUNTER — OFFICE VISIT (OUTPATIENT)
Dept: ORTHOPEDIC SURGERY | Facility: CLINIC | Age: 66
End: 2021-05-05

## 2021-05-05 VITALS — TEMPERATURE: 97.1 F

## 2021-05-05 DIAGNOSIS — Z96.652 STATUS POST TOTAL LEFT KNEE REPLACEMENT: Primary | ICD-10-CM

## 2021-05-05 DIAGNOSIS — Z47.89 ORTHOPEDIC AFTERCARE: ICD-10-CM

## 2021-05-05 PROCEDURE — 99024 POSTOP FOLLOW-UP VISIT: CPT | Performed by: ORTHOPAEDIC SURGERY

## 2021-05-05 NOTE — PROGRESS NOTES
[facility] [place] [facility] [place]    Subjective     Chief Complaint   Patient presents with   • Post-op     3 weeks post Total Knee Arthroplasty Left 21        HPI    Artur Pearson is a 66 y.o. male who follows up for his left total knee arthroplasty done on 2021. He rates his knee as 100 percent better as compared to before surgery. He is walking with a cane, but can ambulate without it. The patient is doing home health for therapy.     I have reviewed the following portions of the patient's history and agree with: History of Present Illness and Review of Systems    Patient Active Problem List   Diagnosis   • Primary osteoarthritis of both knees   • Primary osteoarthritis of left knee   • HTN (hypertension)   • Hyperlipidemia   • Obesity (BMI 30-39.9)   • S/P total knee arthroplasty, left     Past Medical History:   Diagnosis Date   • Disease of thyroid gland    • Hyperlipidemia    • Hypertension    • Rheumatoid arthritis (CMS/HCC)    • Wears reading eyeglasses       Past Surgical History:   Procedure Laterality Date   • COLONOSCOPY     • KNEE SURGERY Right    • OTHER SURGICAL HISTORY      fistula urgery with Leann  approx   • TOTAL KNEE ARTHROPLASTY Left 2021    Procedure: TOTAL KNEE ARTHROPLASTY LEFT;  Surgeon: Edenilson Kline MD;  Location: [facility];  Service: Orthopedics;  Laterality: Left;      Family History   Problem Relation Age of Onset   • No Known Problems Mother    • Heart attack Father      Social History     Socioeconomic History   • Marital status:      Spouse name: Not on file   • Number of children: Not on file   • Years of education: Not on file   • Highest education level: Not on file   Tobacco Use   • Smoking status: Former Smoker     Packs/day: 2.00     Years: 30.00     Pack years: 60.00     Types: Cigarettes     Quit date:      Years since quittin.3   • Smokeless tobacco: Never Used   Substance and Sexual Activity   • Alcohol use: Yes      Alcohol/week: 4.0 - 5.0 standard drinks     Types: 4 - 5 Cans of beer per week   • Drug use: No   • Sexual activity: Defer      Current Outpatient Medications on File Prior to Visit   Medication Sig Dispense Refill   • aspirin EC (aspirin) 325 MG tablet Take 1 tablet by mouth Daily for 30 days. 30 tablet 0   • atorvastatin (LIPITOR) 80 MG tablet Take 80 mg by mouth Every Night.     • bisoprolol (ZEBeta) 10 MG tablet Take 10 mg by mouth Daily.     • Etanercept (ENBREL SC) Inject  under the skin into the appropriate area as directed 1 (One) Time Per Week.     • gabapentin (NEURONTIN) 100 MG capsule Take 100 mg by mouth 3 (Three) Times a Day As Needed.     • HYDROcodone-acetaminophen (NORCO) 7.5-325 MG per tablet Take 1 tablet by mouth Every 6 (Six) Hours As Needed for Moderate Pain .     • levothyroxine (SYNTHROID, LEVOTHROID) 75 MCG tablet Take 75 mcg by mouth Daily.     • losartan (COZAAR) 100 MG tablet Take 100 mg by mouth Daily.     • oxyCODONE (Roxicodone) 5 MG immediate release tablet Take 1 tablet by mouth Every 4 (Four) Hours As Needed for Moderate Pain . 40 tablet 0   • Ropivacine HCl-NaCl (NAROPIN) 20 mg/hr by Peripheral Nerve route Continuous. Indications: Acute Pain       No current facility-administered medications on file prior to visit.      Allergies   Allergen Reactions   • Keflex [Cephalexin] Hives and Rash        Review of Systems     Objective      Physical Exam  Temp 97.1 °F (36.2 °C)     There is no height or weight on file to calculate BMI.    General:   Mental Status:  Alert   Appearance: Cooperative, in no acute distress   Build and Nutrition: Obese by BMI male   Orientation: Alert and oriented to person, place and time   Posture: Normal   Gait: Mildly antalgic on the left     Integument       • Left knee: Wound is well-healed with no signs of infection      Lower Extremities  • Left Knee:        • Tenderness: No medial or lateral joint line tenderness.       • Swelling: None        •  Effusion: Trace       • Crepitus: None       • Atrophy: None       • Range of motion:             • Extension: 0°             • Flexion: 125°        • Instability: No varus or valgus laxity. Negative anterior drawer.       • Deformities: None    Imaging/Studies  Imaging Results (Last 24 Hours)     Procedure Component Value Units Date/Time    XR Knee 3+ View With [place] Left [[id number]] Resulted: 05/05/21 1110     Updated: 05/05/21 1110    Narrative:      Left Knee Radiographs  Indication: status-post left total knee arthroplasty  Views: AP, lateral, and sunrise views of the left knee    Comparison: no change compared to prior study, 4/13/2021    Findings:   The components are well aligned, with no signs of loosening or failure.            Assessment and Plan     Diagnoses and all orders for this visit:    1. Status post total left knee replacement (Primary)  -     XR Knee 3+ View With [place] Left  -     Ambulatory Referral to Physical Therapy Evaluate and treat    2. Orthopedic aftercare        1. Status post total left knee replacement    2. Orthopedic aftercare        I assured the patient that he is healing well from his left total knee arthroplasty as evidenced by exam and his x-rays. He can transition to outpatient therapy now, and I placed a referral for him today. I recommended he continue to use a cane while outside for assistance with ambulation. I advised him to continue with ice but to avoid heat for now as it may cause inflammation.     Return in about 6 weeks (around 6/16/2021).      Scribed for Edenilson Kline MD by Naa Cervantes.  05/05/21   12:13 EDT    I have personally performed the services described in this document as scribed by the above individual, and it is both accurate and complete.  Edenilson Kline MD  5/5/2021  12:51 EDT

## 2021-05-27 ENCOUNTER — HOSPITAL ENCOUNTER (OUTPATIENT)
Dept: HOSPITAL 22 - PT.CARL | Age: 66
LOS: 32 days | Discharge: HOME | End: 2021-06-28
Payer: MEDICARE

## 2021-05-27 DIAGNOSIS — M25.562: ICD-10-CM

## 2021-05-27 DIAGNOSIS — Z96.652: ICD-10-CM

## 2021-05-27 PROCEDURE — G0283 ELEC STIM OTHER THAN WOUND: HCPCS

## 2021-05-27 PROCEDURE — 97014 ELECTRIC STIMULATION THERAPY: CPT

## 2021-05-27 PROCEDURE — 97110 THERAPEUTIC EXERCISES: CPT

## 2021-05-27 PROCEDURE — 97163 PT EVAL HIGH COMPLEX 45 MIN: CPT

## 2021-05-27 PROCEDURE — 97140 MANUAL THERAPY 1/> REGIONS: CPT

## 2021-06-01 ENCOUNTER — HOSPITAL ENCOUNTER (OUTPATIENT)
Age: 66
End: 2021-06-01
Payer: MEDICARE

## 2021-06-01 DIAGNOSIS — I10: Primary | ICD-10-CM

## 2021-06-01 DIAGNOSIS — E03.9: ICD-10-CM

## 2021-06-01 DIAGNOSIS — E78.5: ICD-10-CM

## 2021-06-01 LAB
ALBUMIN LEVEL: 4 G/DL (ref 3.5–5)
ALBUMIN/GLOB SERPL: 1.3 {RATIO} (ref 1.1–1.8)
ALP ISO SERPL-ACNC: 110 U/L (ref 38–126)
ALT SERPLBLD-CCNC: 16 U/L (ref 12–78)
ANION GAP SERPL CALC-SCNC: 12.5 MEQ/L (ref 5–15)
AST SERPL QL: 28 U/L (ref 17–59)
BILIRUBIN,TOTAL: 0.7 MG/DL (ref 0.2–1.3)
BUN SERPL-MCNC: 19 MG/DL (ref 9–20)
CALCIUM SPEC-MCNC: 9.1 MG/DL (ref 8.4–10.2)
CHLORIDE SPEC-SCNC: 105 MMOL/L (ref 98–107)
CHOLEST SPEC-SCNC: 174 MG/DL (ref 140–200)
CO2 SERPL-SCNC: 26 MMOL/L (ref 22–30)
CREAT BLD-SCNC: 1.3 MG/DL (ref 0.66–1.25)
ESTIMATED GLOMERULAR FILT RATE: 55 ML/MIN (ref 60–?)
GFR (AFRICAN AMERICAN): 67 ML/MIN (ref 60–?)
GLOBULIN SER CALC-MCNC: 3 G/DL (ref 1.3–3.2)
GLUCOSE: 107 MG/DL (ref 74–100)
HCT VFR BLD CALC: 36.4 % (ref 42–52)
HDLC SERPL-MCNC: 52 MG/DL (ref 40–60)
HGB BLD-MCNC: 12 G/DL (ref 14.1–18)
MCHC RBC-ENTMCNC: 32.9 G/DL (ref 31.8–35.4)
MCV RBC: 91.4 FL (ref 80–94)
MEAN CORPUSCULAR HEMOGLOBIN: 30 PG (ref 27–31.2)
PLATELET # BLD: 217 K/MM3 (ref 142–424)
POTASSIUM: 4.5 MMOL/L (ref 3.5–5.1)
PROT SERPL-MCNC: 7 G/DL (ref 6.3–8.2)
RBC # BLD AUTO: 3.99 M/MM3 (ref 4.6–6.2)
SODIUM SPEC-SCNC: 139 MMOL/L (ref 136–145)
TRIGLYCERIDES: 107 MG/DL (ref 30–150)
TSH SERPL-ACNC: 3.03 UIU/ML (ref 0.47–4.68)
WBC # BLD AUTO: 6.5 K/MM3 (ref 4.8–10.8)

## 2021-06-01 PROCEDURE — 85025 COMPLETE CBC W/AUTO DIFF WBC: CPT

## 2021-06-01 PROCEDURE — 80053 COMPREHEN METABOLIC PANEL: CPT

## 2021-06-01 PROCEDURE — 84443 ASSAY THYROID STIM HORMONE: CPT

## 2021-06-01 PROCEDURE — 80061 LIPID PANEL: CPT

## 2021-06-16 ENCOUNTER — OFFICE VISIT (OUTPATIENT)
Dept: ORTHOPEDIC SURGERY | Facility: CLINIC | Age: 66
End: 2021-06-16

## 2021-06-16 DIAGNOSIS — Z96.652 STATUS POST TOTAL LEFT KNEE REPLACEMENT: Primary | ICD-10-CM

## 2021-06-16 DIAGNOSIS — Z47.89 ORTHOPEDIC AFTERCARE: ICD-10-CM

## 2021-06-16 PROCEDURE — 99024 POSTOP FOLLOW-UP VISIT: CPT | Performed by: ORTHOPAEDIC SURGERY

## 2021-06-16 RX ORDER — AMLODIPINE BESYLATE 5 MG/1
5 TABLET ORAL DAILY
COMMUNITY
Start: 2021-05-18

## 2021-06-16 RX ORDER — ALLOPURINOL 300 MG/1
TABLET ORAL
COMMUNITY
Start: 2021-05-18

## 2021-06-16 NOTE — PROGRESS NOTES
[facility] [place] [facility] Note    Subjective     Chief Complaint   Patient presents with   • Post-op     6 week f/u; 9 weeks post Total Knee Arthroplasty Left 21        HPI    Artur Pearson is a 66 y.o. male who follows up for status post left total knee arthroplasty performed on 2021.     His left knee feels 100% better than it did prior to surgery. He plans to utilize a soft brace on his left knee when playing golf.    I have reviewed the following portions of the patient's history and agree with: History of Present Illness and Review of Systems    Patient Active Problem List   Diagnosis   • Primary osteoarthritis of both knees   • Primary osteoarthritis of left knee   • HTN (hypertension)   • Hyperlipidemia   • Obesity (BMI 30-39.9)   • S/P total knee arthroplasty, left     Past Medical History:   Diagnosis Date   • Disease of thyroid gland    • Hyperlipidemia    • Hypertension    • Rheumatoid arthritis (CMS/HCC)    • Wears reading eyeglasses       Past Surgical History:   Procedure Laterality Date   • COLONOSCOPY     • KNEE SURGERY Right    • OTHER SURGICAL HISTORY      fistula urgery with Leann  approx   • TOTAL KNEE ARTHROPLASTY Left 2021    Procedure: TOTAL KNEE ARTHROPLASTY LEFT;  Surgeon: Edenilson Kline MD;  Location: [facility];  Service: Orthopedics;  Laterality: Left;      Family History   Problem Relation Age of Onset   • No Known Problems Mother    • Heart attack Father      Social History     Socioeconomic History   • Marital status:      Spouse name: Not on file   • Number of children: Not on file   • Years of education: Not on file   • Highest education level: Not on file   Tobacco Use   • Smoking status: Former Smoker     Packs/day: 2.00     Years: 30.00     Pack years: 60.00     Types: Cigarettes     Quit date:      Years since quittin.4   • Smokeless tobacco: Never Used   Substance and Sexual Activity   • Alcohol use: Yes     Alcohol/week: 4.0  - 5.0 standard drinks     Types: 4 - 5 Cans of beer per week   • Drug use: No   • Sexual activity: Defer      Current Outpatient Medications on File Prior to Visit   Medication Sig Dispense Refill   • allopurinol (ZYLOPRIM) 300 MG tablet      • amLODIPine (NORVASC) 5 MG tablet Take 5 mg by mouth Daily.     • atorvastatin (LIPITOR) 80 MG tablet Take 80 mg by mouth Every Night.     • bisoprolol (ZEBeta) 10 MG tablet Take 10 mg by mouth Daily.     • Etanercept (ENBREL SC) Inject  under the skin into the appropriate area as directed 1 (One) Time Per Week.     • gabapentin (NEURONTIN) 100 MG capsule Take 100 mg by mouth 3 (Three) Times a Day As Needed.     • HYDROcodone-acetaminophen (NORCO) 7.5-325 MG per tablet Take 1 tablet by mouth Every 6 (Six) Hours As Needed for Moderate Pain .     • levothyroxine (SYNTHROID, LEVOTHROID) 75 MCG tablet Take 75 mcg by mouth Daily.     • losartan (COZAAR) 100 MG tablet Take 100 mg by mouth Daily.     • methotrexate 2.5 MG tablet      • Ropivacine HCl-NaCl (NAROPIN) 20 mg/hr by Peripheral Nerve route Continuous. Indications: Acute Pain       No current facility-administered medications on file prior to visit.      Allergies   Allergen Reactions   • Keflex [Cephalexin] Hives and Rash        Review of Systems   Constitutional: Negative for activity change, appetite change, chills, diaphoresis, fatigue, fever and unexpected weight change.   HENT: Negative for congestion, dental problem, drooling, ear discharge, ear pain, facial swelling, hearing loss, mouth sores, nosebleeds, postnasal drip, rhinorrhea, sinus pressure, sneezing, sore throat, tinnitus, trouble swallowing and voice change.    Eyes: Negative for photophobia, pain, discharge, redness, itching and visual disturbance.   Respiratory: Negative for apnea, cough, choking, chest tightness, shortness of breath, wheezing and stridor.    Cardiovascular: Negative for chest pain, palpitations and leg swelling.   Gastrointestinal:  Negative for abdominal distention, abdominal pain, anal bleeding, blood in stool, constipation, diarrhea, nausea, rectal pain and vomiting.   Endocrine: Negative for cold intolerance, heat intolerance, polydipsia, polyphagia and polyuria.   Genitourinary: Negative for decreased urine volume, difficulty urinating, dysuria, enuresis, flank pain, frequency, genital sores, hematuria and urgency.   Musculoskeletal: Positive for arthralgias. Negative for back pain, gait problem, joint swelling, myalgias, neck pain and neck stiffness.   Skin: Negative for color change, pallor, rash and wound.   Allergic/Immunologic: Negative for environmental allergies, food allergies and immunocompromised state.   Neurological: Negative for dizziness, tremors, seizures, syncope, facial asymmetry, speech difficulty, weakness, light-headedness, numbness and headaches.   Hematological: Negative for adenopathy. Does not bruise/bleed easily.   Psychiatric/Behavioral: Negative for agitation, behavioral problems, confusion, decreased concentration, dysphoric mood, hallucinations, self-injury, sleep disturbance and suicidal ideas. The patient is not nervous/anxious and is not hyperactive.         Objective      Physical Exam  There were no vitals taken for this visit.    There is no height or weight on file to calculate BMI.    General:   Mental Status:  Alert   Appearance: Cooperative, in no acute distress   Build and Nutrition: Obese by BMI male   Orientation: Alert and oriented to person, place and time   Posture: Normal   Gait: Ambulatory without external aids and is nontalgic.    Integument  • Left knee: Wound is well-healed with no signs of infection    Lower Extremities  • Left Knee:  • Tenderness: No medial or lateral joint line tenderness.  • Swelling: None  • Effusion: Negative  • Crepitus: None  • Atrophy: None  • Range of motion:  • Extension: 0°  • Flexion: 125°  • Instability: No varus or valgus laxity. Negative anterior drawer.  •  Deformities: None    Imaging/Studies  No new radiographs were obtained.       Assessment and Plan     Diagnoses and all orders for this visit:    1. Status post total left knee replacement (Primary)    2. Orthopedic aftercare        1. Status post total left knee replacement    2. Orthopedic aftercare        I have reviewed my findings with the patient today. The patient is doing very well postoperatively. He may return to golfing as tolerated.    Return in about 10 months (around 4/16/2022) for Recheck with X-Rays.      Scribed for Edenilson Kline MD by Chaka Saini.  06/16/21   13:55 EDT    I have personally performed the services described in this document as scribed by the above individual, and it is both accurate and complete.  Edenilson Kline MD  6/16/2021  14:01 EDT

## 2021-10-13 ENCOUNTER — HOSPITAL ENCOUNTER (OUTPATIENT)
Age: 66
End: 2021-10-13
Payer: MEDICARE

## 2021-10-13 DIAGNOSIS — M1A.09X0: ICD-10-CM

## 2021-10-13 DIAGNOSIS — E03.9: ICD-10-CM

## 2021-10-13 DIAGNOSIS — E78.5: Primary | ICD-10-CM

## 2021-10-13 LAB
ALBUMIN LEVEL: 4.3 G/DL (ref 3.5–5)
ALBUMIN/GLOB SERPL: 1.3 {RATIO} (ref 1.1–1.8)
ALP ISO SERPL-ACNC: 104 U/L (ref 38–126)
ALT SERPLBLD-CCNC: 22 U/L (ref 12–78)
ANION GAP SERPL CALC-SCNC: 15.3 MEQ/L (ref 5–15)
AST SERPL QL: 34 U/L (ref 17–59)
BILIRUBIN,TOTAL: 1 MG/DL (ref 0.2–1.3)
BUN SERPL-MCNC: 18 MG/DL (ref 9–20)
CALCIUM SPEC-MCNC: 9.7 MG/DL (ref 8.4–10.2)
CHLORIDE SPEC-SCNC: 105 MMOL/L (ref 98–107)
CHOLEST SPEC-SCNC: 191 MG/DL (ref 140–200)
CO2 SERPL-SCNC: 22 MMOL/L (ref 22–30)
CREAT BLD-SCNC: 1.1 MG/DL (ref 0.66–1.25)
ESTIMATED GLOMERULAR FILT RATE: 67 ML/MIN (ref 60–?)
GFR (AFRICAN AMERICAN): 81 ML/MIN (ref 60–?)
GLOBULIN SER CALC-MCNC: 3.3 G/DL (ref 1.3–3.2)
GLUCOSE: 99 MG/DL (ref 74–100)
HCT VFR BLD CALC: 41.6 % (ref 42–52)
HDLC SERPL-MCNC: 55 MG/DL (ref 40–60)
HGB BLD-MCNC: 13.5 G/DL (ref 14.1–18)
MCHC RBC-ENTMCNC: 32.5 G/DL (ref 31.8–35.4)
MCV RBC: 98.8 FL (ref 80–94)
MEAN CORPUSCULAR HEMOGLOBIN: 32.1 PG (ref 27–31.2)
PLATELET # BLD: 249 K/MM3 (ref 142–424)
POTASSIUM: 4.3 MMOL/L (ref 3.5–5.1)
PROT SERPL-MCNC: 7.6 G/DL (ref 6.3–8.2)
RBC # BLD AUTO: 4.21 M/MM3 (ref 4.6–6.2)
SODIUM SPEC-SCNC: 138 MMOL/L (ref 136–145)
TRIGLYCERIDES: 230 MG/DL (ref 30–150)
TSH SERPL-ACNC: 2.86 UIU/ML (ref 0.47–4.68)
URATE SERPL-SCNC: 9.3 MG/DL (ref 3.5–8.5)
WBC # BLD AUTO: 7 K/MM3 (ref 4.8–10.8)

## 2021-10-13 PROCEDURE — 85025 COMPLETE CBC W/AUTO DIFF WBC: CPT

## 2021-10-13 PROCEDURE — 84443 ASSAY THYROID STIM HORMONE: CPT

## 2021-10-13 PROCEDURE — 84550 ASSAY OF BLOOD/URIC ACID: CPT

## 2021-10-13 PROCEDURE — 80061 LIPID PANEL: CPT

## 2021-10-13 PROCEDURE — 80053 COMPREHEN METABOLIC PANEL: CPT

## 2022-04-27 ENCOUNTER — OFFICE VISIT (OUTPATIENT)
Dept: ORTHOPEDIC SURGERY | Facility: CLINIC | Age: 67
End: 2022-04-27

## 2022-04-27 VITALS
WEIGHT: 252.6 LBS | DIASTOLIC BLOOD PRESSURE: 70 MMHG | BODY MASS INDEX: 36.16 KG/M2 | SYSTOLIC BLOOD PRESSURE: 132 MMHG | HEIGHT: 70 IN

## 2022-04-27 DIAGNOSIS — Z09 POSTOPERATIVE EXAMINATION: ICD-10-CM

## 2022-04-27 DIAGNOSIS — Z96.652 STATUS POST TOTAL LEFT KNEE REPLACEMENT: Primary | ICD-10-CM

## 2022-04-27 PROCEDURE — 99212 OFFICE O/P EST SF 10 MIN: CPT | Performed by: ORTHOPAEDIC SURGERY

## 2022-04-27 RX ORDER — PREDNISONE 1 MG/1
TABLET ORAL
COMMUNITY
Start: 2022-04-22

## 2022-04-27 ASSESSMENT — KOOS JR
KOOS JR SCORE: 76.332
KOOS JR SCORE: 4

## 2022-04-27 NOTE — PROGRESS NOTES
[facility] [place] [facility] Note    Subjective     Chief Complaint   Patient presents with   • Follow-up     10 month follow up -- 1 year s/p Total Knee Arthroplasty Left 22        HPI    It has been 10  month(s) since Mr. Alicia's last visit. He returns to clinic today for follow-up of left knee arthroplasty. The issue has been ongoing for 1 year(s). He rates his pain a 1/10 on the pain scale. Previous/current treatments: nothing. Current symptoms: no pain . The pain is worse with nothing ; nothing improve the pain. Overall, he is doing better.  100% improvement compared to his preoperative symptoms.    I have reviewed the following portions of the patient's history and agree with: History of Present Illness and Review of Systems    Patient Active Problem List   Diagnosis   • Primary osteoarthritis of both knees   • Primary osteoarthritis of left knee   • HTN (hypertension)   • Hyperlipidemia   • Obesity (BMI 30-39.9)   • S/P total knee arthroplasty, left     Past Medical History:   Diagnosis Date   • Disease of thyroid gland    • Hyperlipidemia    • Hypertension    • Rheumatoid arthritis (HCC)    • Wears reading eyeglasses       Past Surgical History:   Procedure Laterality Date   • COLONOSCOPY     • KNEE SURGERY Right    • OTHER SURGICAL HISTORY      fistula urgery with Leann  approx   • TOTAL KNEE ARTHROPLASTY Left 2021    Procedure: TOTAL KNEE ARTHROPLASTY LEFT;  Surgeon: Edenilson Kline MD;  Location: [facility];  Service: Orthopedics;  Laterality: Left;      Family History   Problem Relation Age of Onset   • No Known Problems Mother    • Heart attack Father      Social History     Socioeconomic History   • Marital status:    Tobacco Use   • Smoking status: Former Smoker     Packs/day: 2.00     Years: 30.00     Pack years: 60.00     Types: Cigarettes     Quit date:      Years since quittin.3   • Smokeless tobacco: Never Used   Substance and Sexual Activity   • Alcohol  use: Yes     Alcohol/week: 4.0 - 5.0 standard drinks     Types: 4 - 5 Cans of beer per week   • Drug use: No   • Sexual activity: Defer      Current Outpatient Medications on File Prior to Visit   Medication Sig Dispense Refill   • allopurinol (ZYLOPRIM) 300 MG tablet      • amLODIPine (NORVASC) 5 MG tablet Take 5 mg by mouth Daily.     • atorvastatin (LIPITOR) 80 MG tablet Take 80 mg by mouth Every Night.     • bisoprolol (ZEBeta) 10 MG tablet Take 10 mg by mouth Daily.     • Etanercept (ENBREL SC) Inject  under the skin into the appropriate area as directed 1 (One) Time Per Week.     • gabapentin (NEURONTIN) 100 MG capsule Take 100 mg by mouth 3 (Three) Times a Day As Needed.     • HYDROcodone-acetaminophen (NORCO) 7.5-325 MG per tablet Take 1 tablet by mouth Every 6 (Six) Hours As Needed for Moderate Pain .     • levothyroxine (SYNTHROID, LEVOTHROID) 75 MCG tablet Take 75 mcg by mouth Daily.     • losartan (COZAAR) 100 MG tablet Take 100 mg by mouth Daily.     • methotrexate 2.5 MG tablet      • predniSONE (DELTASONE) 5 MG tablet      • Ropivacine HCl-NaCl (NAROPIN) 20 mg/hr by Peripheral Nerve route Continuous. Indications: Acute Pain       No current facility-administered medications on file prior to visit.      Allergies   Allergen Reactions   • Keflex [Cephalexin] Hives and Rash        Review of Systems   Constitutional: Negative for activity change, appetite change, chills, diaphoresis, fatigue, fever and unexpected weight change.   HENT: Negative for congestion, dental problem, drooling, ear discharge, ear pain, facial swelling, hearing loss, mouth sores, nosebleeds, postnasal drip, rhinorrhea, sinus pressure, sneezing, sore throat, tinnitus, trouble swallowing and voice change.    Eyes: Negative for photophobia, pain, discharge, redness, itching and visual disturbance.   Respiratory: Negative for apnea, cough, choking, chest tightness, shortness of breath, wheezing and stridor.    Cardiovascular: Negative  "for chest pain, palpitations and leg swelling.   Gastrointestinal: Negative for abdominal distention, abdominal pain, anal bleeding, blood in stool, constipation, diarrhea, nausea, rectal pain and vomiting.   Endocrine: Negative for cold intolerance, heat intolerance, polydipsia, polyphagia and polyuria.   Genitourinary: Negative for decreased urine volume, difficulty urinating, dysuria, enuresis, flank pain, frequency, genital sores, hematuria and urgency.   Musculoskeletal: Positive for arthralgias. Negative for back pain, gait problem, joint swelling, myalgias, neck pain and neck stiffness.   Skin: Negative for color change, pallor, rash and wound.   Allergic/Immunologic: Negative for environmental allergies, food allergies and immunocompromised state.   Neurological: Negative for dizziness, tremors, seizures, syncope, facial asymmetry, speech difficulty, weakness, light-headedness, numbness and headaches.   Hematological: Negative for adenopathy. Does not bruise/bleed easily.   Psychiatric/Behavioral: Negative for agitation, behavioral problems, confusion, decreased concentration, dysphoric mood, hallucinations, self-injury, sleep disturbance and suicidal ideas. The patient is not nervous/anxious and is not hyperactive.         Objective      Physical Exam  /70   Ht 177.8 cm (70\")   Wt 115 kg (252 lb 9.6 oz)   BMI 36.24 kg/m²     Body mass index is 36.24 kg/m².    General:   Mental Status:  Alert   Appearance: Cooperative, in no acute distress   Build and Nutrition: Obese by BMI male   Orientation: Alert and oriented to person, place and time   Posture: Normal   Gait: With a cane    Integument:   Left knee: Wound is well-healed with no signs of infection    Lower Extremities:   Left Knee:    Tenderness:  None    Effusion:  None    Swelling: None    Crepitus:  None    Range of motion:  Extension: 0°       Flexion: 120°  Instability:  No varus laxity, no valgus laxity, negative anterior " drawer  Deformities:  None      Imaging/Studies  Imaging Results (Last 24 Hours)     Procedure Component Value Units Date/Time    XR Knee 3+ View With South Range Left [[id number]] Resulted: 04/27/22 1157     Updated: 04/27/22 1157    Narrative:      Left Knee Radiographs  Indication: status-post left total knee arthroplasty  Views: AP, lateral, and sunrise views of the left knee    Comparison: no change compared to prior study, 5/5/2021    Findings:   The components are well aligned, with no signs of loosening or failure.            Assessment and Plan     Diagnoses and all orders for this visit:    1. Status post total left knee replacement (Primary)  -     XR Knee 3+ View With [place] Range Left    2. Postoperative examination        1. Status post total left knee replacement    2. Postoperative examination        I reviewed my findings with the patient.  His left total knee arthroplasty is functioning well, and is pleased with results.  I will see him back in 4 years, which will be a 5-year checkup with x-rays, but sooner for any problems.    Return in about 4 years (around 4/27/2026) for Recheck with X-Rays.      Edenilson Kline MD  04/27/22  13:54 EDT

## 2022-06-30 LAB
ALBUMIN LEVEL: 3.7 G/DL (ref 3.5–5)
ALBUMIN/GLOB SERPL: 1.2 {RATIO} (ref 1.1–1.8)
ALP ISO SERPL-ACNC: 88 U/L (ref 38–126)
ALT SERPLBLD-CCNC: 35 U/L (ref 12–78)
ANION GAP SERPL CALC-SCNC: 13.3 MEQ/L (ref 5–15)
AST SERPL QL: 35 U/L (ref 17–59)
BILIRUBIN,TOTAL: 0.3 MG/DL (ref 0.2–1.3)
BUN SERPL-MCNC: 15 MG/DL (ref 9–20)
CALCIUM SPEC-MCNC: 9.2 MG/DL (ref 8.4–10.2)
CHLORIDE SPEC-SCNC: 102 MMOL/L (ref 98–107)
CO2 SERPL-SCNC: 24 MMOL/L (ref 22–30)
CREAT BLD-SCNC: 1 MG/DL (ref 0.66–1.25)
ESTIMATED GLOMERULAR FILT RATE: 75 ML/MIN (ref 60–?)
GFR (AFRICAN AMERICAN): 90 ML/MIN (ref 60–?)
GLOBULIN SER CALC-MCNC: 3 G/DL (ref 1.3–3.2)
GLUCOSE: 129 MG/DL (ref 74–100)
POTASSIUM: 4.3 MMOL/L (ref 3.5–5.1)
PROT SERPL-MCNC: 6.7 G/DL (ref 6.3–8.2)
SODIUM SPEC-SCNC: 135 MMOL/L (ref 136–145)
TSH SERPL-ACNC: 1.42 UIU/ML (ref 0.47–4.68)

## 2022-07-01 ENCOUNTER — HOSPITAL ENCOUNTER (OUTPATIENT)
Age: 67
End: 2022-07-01
Payer: MEDICARE

## 2022-07-01 DIAGNOSIS — I10: ICD-10-CM

## 2022-07-01 DIAGNOSIS — M06.9: Primary | ICD-10-CM

## 2022-07-01 DIAGNOSIS — E29.1: ICD-10-CM

## 2022-07-01 DIAGNOSIS — E66.9: ICD-10-CM

## 2022-07-01 PROCEDURE — 82652 VIT D 1 25-DIHYDROXY: CPT

## 2022-07-01 PROCEDURE — 84402 ASSAY OF FREE TESTOSTERONE: CPT

## 2022-07-01 PROCEDURE — 80053 COMPREHEN METABOLIC PANEL: CPT

## 2022-07-01 PROCEDURE — 84403 ASSAY OF TOTAL TESTOSTERONE: CPT

## 2022-07-01 PROCEDURE — 84443 ASSAY THYROID STIM HORMONE: CPT

## 2022-07-08 LAB
TESTOST FREE SERPL-SCNC: 3.9 PG/ML (ref 6.6–18.1)
TESTOSTERONE, TOTAL, LC/MS: 297.7 NG/DL (ref 264–916)

## 2022-07-13 LAB
1,25-DIHYDROXY, VITAMIN D-2: 42 PG/ML
1,25-DIHYDROXY, VITAMIN D-3: 10 PG/ML
VIT D25+D1,25 OH+D1,25 PNL SERPL-MCNC: 52 PG/ML

## 2022-09-08 ENCOUNTER — HOSPITAL ENCOUNTER (OUTPATIENT)
Age: 67
End: 2022-09-08
Payer: MEDICARE

## 2022-09-08 DIAGNOSIS — E03.9: ICD-10-CM

## 2022-09-08 DIAGNOSIS — I10: ICD-10-CM

## 2022-09-08 DIAGNOSIS — E29.1: Primary | ICD-10-CM

## 2022-09-08 DIAGNOSIS — Z79.899: ICD-10-CM

## 2022-09-08 LAB
ALBUMIN LEVEL: 3.9 G/DL (ref 3.5–5)
ALBUMIN/GLOB SERPL: 1.4 {RATIO} (ref 1.1–1.8)
ALP ISO SERPL-ACNC: 124 U/L (ref 38–126)
ALT SERPLBLD-CCNC: 30 U/L (ref 12–78)
ANION GAP SERPL CALC-SCNC: 14.4 MEQ/L (ref 5–15)
AST SERPL QL: 36 U/L (ref 17–59)
BILIRUBIN,TOTAL: 0.9 MG/DL (ref 0.2–1.3)
BUN SERPL-MCNC: 22 MG/DL (ref 9–20)
CALCIUM SPEC-MCNC: 8.5 MG/DL (ref 8.4–10.2)
CHLORIDE SPEC-SCNC: 104 MMOL/L (ref 98–107)
CO2 SERPL-SCNC: 23 MMOL/L (ref 22–30)
CREAT BLD-SCNC: 1.3 MG/DL (ref 0.66–1.25)
ESTIMATED GLOMERULAR FILT RATE: 55 ML/MIN (ref 60–?)
FT4I SERPL CALC-MCNC: 4.6 UG/DL (ref 5.93–13.13)
GFR (AFRICAN AMERICAN): 67 ML/MIN (ref 60–?)
GLOBULIN SER CALC-MCNC: 2.8 G/DL (ref 1.3–3.2)
GLUCOSE: 82 MG/DL (ref 74–100)
HCT VFR BLD CALC: 37.8 % (ref 42–52)
HGB BLD-MCNC: 12.7 G/DL (ref 14.1–18)
MCHC RBC-ENTMCNC: 33.6 G/DL (ref 31.8–35.4)
MCV RBC: 92.2 FL (ref 80–94)
MEAN CORPUSCULAR HEMOGLOBIN: 31 PG (ref 27–31.2)
PLATELET # BLD: 158 K/MM3 (ref 142–424)
POTASSIUM: 4.4 MMOL/L (ref 3.5–5.1)
PROT SERPL-MCNC: 6.7 G/DL (ref 6.3–8.2)
RBC # BLD AUTO: 4.1 M/MM3 (ref 4.6–6.2)
SODIUM SPEC-SCNC: 137 MMOL/L (ref 136–145)
T3RU NFR SERPL: 37 % (ref 23.5–40.5)
T4 (THYROXINE): 12.3 UG/DL (ref 5.53–11)
TSH SERPL-ACNC: 3.48 UIU/ML (ref 0.47–4.68)
WBC # BLD AUTO: 3.1 K/MM3 (ref 4.8–10.8)

## 2022-09-08 PROCEDURE — 84402 ASSAY OF FREE TESTOSTERONE: CPT

## 2022-09-08 PROCEDURE — 84479 ASSAY OF THYROID (T3 OR T4): CPT

## 2022-09-08 PROCEDURE — 84443 ASSAY THYROID STIM HORMONE: CPT

## 2022-09-08 PROCEDURE — 85025 COMPLETE CBC W/AUTO DIFF WBC: CPT

## 2022-09-08 PROCEDURE — 84436 ASSAY OF TOTAL THYROXINE: CPT

## 2022-09-08 PROCEDURE — 84403 ASSAY OF TOTAL TESTOSTERONE: CPT

## 2022-09-08 PROCEDURE — 80053 COMPREHEN METABOLIC PANEL: CPT

## 2022-09-13 LAB
TESTOST FREE SERPL-SCNC: 3.4 PG/ML (ref 6.6–18.1)
TESTOSTERONE, TOTAL, LC/MS: 226.1 NG/DL (ref 264–916)

## 2023-02-14 ENCOUNTER — HOSPITAL ENCOUNTER (OUTPATIENT)
Age: 68
Discharge: HOME | End: 2023-02-14
Payer: MEDICARE

## 2023-02-14 VITALS
TEMPERATURE: 98.06 F | DIASTOLIC BLOOD PRESSURE: 67 MMHG | SYSTOLIC BLOOD PRESSURE: 151 MMHG | OXYGEN SATURATION: 99 % | RESPIRATION RATE: 18 BRPM | HEART RATE: 63 BPM

## 2023-02-14 VITALS
RESPIRATION RATE: 17 BRPM | DIASTOLIC BLOOD PRESSURE: 54 MMHG | SYSTOLIC BLOOD PRESSURE: 119 MMHG | OXYGEN SATURATION: 98 % | HEART RATE: 58 BPM

## 2023-02-14 VITALS
DIASTOLIC BLOOD PRESSURE: 62 MMHG | OXYGEN SATURATION: 97 % | HEART RATE: 59 BPM | SYSTOLIC BLOOD PRESSURE: 120 MMHG | RESPIRATION RATE: 17 BRPM

## 2023-02-14 VITALS
TEMPERATURE: 97.88 F | OXYGEN SATURATION: 97 % | DIASTOLIC BLOOD PRESSURE: 63 MMHG | RESPIRATION RATE: 15 BRPM | SYSTOLIC BLOOD PRESSURE: 121 MMHG | HEART RATE: 60 BPM

## 2023-02-14 VITALS
SYSTOLIC BLOOD PRESSURE: 105 MMHG | RESPIRATION RATE: 18 BRPM | OXYGEN SATURATION: 95 % | HEART RATE: 59 BPM | DIASTOLIC BLOOD PRESSURE: 63 MMHG

## 2023-02-14 VITALS — BODY MASS INDEX: 35.9 KG/M2

## 2023-02-14 VITALS — OXYGEN SATURATION: 99 %

## 2023-02-14 DIAGNOSIS — Z79.899: ICD-10-CM

## 2023-02-14 DIAGNOSIS — Z91.199: ICD-10-CM

## 2023-02-14 DIAGNOSIS — K57.30: ICD-10-CM

## 2023-02-14 DIAGNOSIS — Z12.11: Primary | ICD-10-CM

## 2023-02-14 PROCEDURE — 0DJD8ZZ INSPECTION OF LOWER INTESTINAL TRACT, VIA NATURAL OR ARTIFICIAL OPENING ENDOSCOPIC: ICD-10-PCS | Performed by: SURGERY

## (undated) DEVICE — BNDG ELAS W/CLIP 6IN 10YD LF STRL

## (undated) DEVICE — STRYKER PERFORMANCE SERIES SAGITTAL BLADE: Brand: STRYKER PERFORMANCE SERIES

## (undated) DEVICE — GLV SURG SENSICARE W/ALOE PF LF 9 STRL

## (undated) DEVICE — ANTIBACTERIAL UNDYED BRAIDED (POLYGLACTIN 910), SYNTHETIC ABSORBABLE SUTURE: Brand: COATED VICRYL

## (undated) DEVICE — DRSNG PAD ABD 8X10IN STRL

## (undated) DEVICE — UNDERCAST PADDING: Brand: DEROYAL

## (undated) DEVICE — Device

## (undated) DEVICE — PUMP PAIN AUTOFUSER AUTO SELCT NOBOLUS 1TO14ML/HR 550ML DISP

## (undated) DEVICE — SYS CLS SKIN PREMIERPRO EXOFINFUSION 22CM

## (undated) DEVICE — GLV SURG PREMIERPRO MIC LTX PF SZ8.5 BRN

## (undated) DEVICE — TRY EPID SFTY 18G 3.5IN 1T7680

## (undated) DEVICE — CEMENT MIXING SYSTEM WITH MIS FEMORAL BREAKAWAY NOZZLE: Brand: REVOLUTION

## (undated) DEVICE — PK KN TOTL 10

## (undated) DEVICE — NDL HYPO ECLPS SFTY 18G 1 1/2IN

## (undated) DEVICE — CVR HNDL LIGHT RIGID